# Patient Record
Sex: MALE | Race: WHITE | NOT HISPANIC OR LATINO
[De-identification: names, ages, dates, MRNs, and addresses within clinical notes are randomized per-mention and may not be internally consistent; named-entity substitution may affect disease eponyms.]

---

## 2021-06-17 ENCOUNTER — TRANSCRIPTION ENCOUNTER (OUTPATIENT)
Age: 41
End: 2021-06-17

## 2021-09-29 ENCOUNTER — APPOINTMENT (OUTPATIENT)
Dept: PULMONOLOGY | Facility: CLINIC | Age: 41
End: 2021-09-29
Payer: COMMERCIAL

## 2021-09-29 VITALS
BODY MASS INDEX: 42.74 KG/M2 | RESPIRATION RATE: 15 BRPM | SYSTOLIC BLOOD PRESSURE: 169 MMHG | HEIGHT: 68 IN | DIASTOLIC BLOOD PRESSURE: 91 MMHG | OXYGEN SATURATION: 96 % | WEIGHT: 282 LBS | HEART RATE: 105 BPM | TEMPERATURE: 97 F

## 2021-09-29 DIAGNOSIS — G47.10 HYPERSOMNIA, UNSPECIFIED: ICD-10-CM

## 2021-09-29 DIAGNOSIS — G47.419 NARCOLEPSY W/OUT CATAPLEXY: ICD-10-CM

## 2021-09-29 PROCEDURE — 99204 OFFICE O/P NEW MOD 45 MIN: CPT | Mod: GC

## 2021-09-29 NOTE — PHYSICAL EXAM
[General Appearance - Well Developed] : well developed [Normal Conjunctiva] : the conjunctiva exhibited no abnormalities [Normal Oropharynx] : normal oropharynx [Neck Appearance] : the appearance of the neck was normal [Heart Rate And Rhythm] : heart rate was normal and rhythm regular [Heart Sounds] : normal S1 and S2 [Respiration, Rhythm And Depth] : normal respiratory rhythm and effort [Exaggerated Use Of Accessory Muscles For Inspiration] : no accessory muscle use [Auscultation Breath Sounds / Voice Sounds] : lungs were clear to auscultation bilaterally [Abnormal Walk] : normal gait [Nail Clubbing] : no clubbing  or cyanosis of the fingernails [] : no rash [No Focal Deficits] : no focal deficits [Oriented To Time, Place, And Person] : oriented to person, place, and time [Impaired Insight] : insight and judgment were intact [Affect] : the affect was normal
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
## 2021-09-30 NOTE — REVIEW OF SYSTEMS
[EDS: ESS=____] : daytime somnolence: ESS=[unfilled] [Fatigue] : fatigue [Recent Wt Gain (___ Lbs)] : recent [unfilled] ~Ulb weight gain [Snoring] : snoring [Witnessed Apneas] : witnessed apnea [Obesity] : obesity [Unusual Sleep Behavior] : unusual sleep behavior [Hypersomnolence] : sleeping much more than usual [Hypnogogic Hallucinations] : hypnogogic hallucinations [A.M. Dry Mouth] : no a.m. dry mouth [Chest Pain] : no chest pain [Palpitations] : no palpitations [Edema] : ~T edema was not present [CHF] : no congestive heart failure [Thyroid Disease] : no thyroid disease [Diabetes] : no diabetes  [Anemia] : no anemia [A.M. Headache] : no headache present upon awakening [Leg Dysesthesias] : no leg dysesthesias [Heartburn] : no heartburn [Nocturia] : no nocturia [Arthralgias] : no arthralgias [Depression] : no depression [Anxious] : not anxious [Difficulty Initiating Sleep] : no difficulty falling asleep [Difficulty Maintaining Sleep] : no difficulty maintaining sleep [Acute Insomnia] : no acute insomnia [Chronic Insomnia] : no chronic  insomnia [Lower Extremity Discomfort] : no lower extremity discomfort [Irresistible urge to move legs] : no irresistible urge to move legs because of lower extremity discomfort [LE discomfort relieved by movement] : lower extremity discomfort not relieved by movement [Late day/ Evening symptoms] : no late day/evening symptoms [Sleep Disturbances due to LE symptoms] : ~T no sleep disturbances due to lower extremity symptoms [Cataplexy] :  no cataplexy [Hypnopompic Hallucinations] : no hypnopompic hallucinations [Sleep Paralysis] : no sleep paralysis [FreeTextEntry8] : Dyspnea on exertion [de-identified] : history of sleep walking and sleep talking
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
## 2021-12-16 ENCOUNTER — APPOINTMENT (OUTPATIENT)
Dept: SLEEP CENTER | Facility: CLINIC | Age: 41
End: 2021-12-16

## 2021-12-18 ENCOUNTER — NON-APPOINTMENT (OUTPATIENT)
Age: 41
End: 2021-12-18

## 2022-01-03 ENCOUNTER — APPOINTMENT (OUTPATIENT)
Dept: FAMILY MEDICINE | Facility: CLINIC | Age: 42
End: 2022-01-03
Payer: COMMERCIAL

## 2022-01-03 ENCOUNTER — NON-APPOINTMENT (OUTPATIENT)
Age: 42
End: 2022-01-03

## 2022-01-03 VITALS
HEIGHT: 68 IN | TEMPERATURE: 97.4 F | RESPIRATION RATE: 16 BRPM | OXYGEN SATURATION: 98 % | BODY MASS INDEX: 43.5 KG/M2 | HEART RATE: 90 BPM | DIASTOLIC BLOOD PRESSURE: 84 MMHG | WEIGHT: 287 LBS | SYSTOLIC BLOOD PRESSURE: 140 MMHG

## 2022-01-03 VITALS — SYSTOLIC BLOOD PRESSURE: 150 MMHG | DIASTOLIC BLOOD PRESSURE: 90 MMHG

## 2022-01-03 DIAGNOSIS — Z83.49 FAMILY HISTORY OF OTHER ENDOCRINE, NUTRITIONAL AND METABOLIC DISEASES: ICD-10-CM

## 2022-01-03 DIAGNOSIS — Z82.49 FAMILY HISTORY OF ISCHEMIC HEART DISEASE AND OTHER DISEASES OF THE CIRCULATORY SYSTEM: ICD-10-CM

## 2022-01-03 DIAGNOSIS — Z87.891 PERSONAL HISTORY OF NICOTINE DEPENDENCE: ICD-10-CM

## 2022-01-03 DIAGNOSIS — Z13.228 ENCOUNTER FOR SCREENING FOR OTHER SUSPECTED ENDOCRINE DISORDER: ICD-10-CM

## 2022-01-03 DIAGNOSIS — Z13.0 ENCOUNTER FOR SCREENING FOR OTHER SUSPECTED ENDOCRINE DISORDER: ICD-10-CM

## 2022-01-03 DIAGNOSIS — Z13.29 ENCOUNTER FOR SCREENING FOR OTHER SUSPECTED ENDOCRINE DISORDER: ICD-10-CM

## 2022-01-03 DIAGNOSIS — K58.0 IRRITABLE BOWEL SYNDROME WITH DIARRHEA: ICD-10-CM

## 2022-01-03 DIAGNOSIS — Z83.3 FAMILY HISTORY OF DIABETES MELLITUS: ICD-10-CM

## 2022-01-03 DIAGNOSIS — Z76.89 PERSONS ENCOUNTERING HEALTH SERVICES IN OTHER SPECIFIED CIRCUMSTANCES: ICD-10-CM

## 2022-01-03 DIAGNOSIS — Z78.9 OTHER SPECIFIED HEALTH STATUS: ICD-10-CM

## 2022-01-03 DIAGNOSIS — Z83.438 FAMILY HISTORY OF OTHER DISORDER OF LIPOPROTEIN METABOLISM AND OTHER LIPIDEMIA: ICD-10-CM

## 2022-01-03 DIAGNOSIS — Z13.21 ENCOUNTER FOR SCREENING FOR NUTRITIONAL DISORDER: ICD-10-CM

## 2022-01-03 PROCEDURE — 93000 ELECTROCARDIOGRAM COMPLETE: CPT

## 2022-01-03 PROCEDURE — 99204 OFFICE O/P NEW MOD 45 MIN: CPT | Mod: 25

## 2022-01-03 NOTE — PHYSICAL EXAM
[No Acute Distress] : no acute distress [Well Nourished] : well nourished [Well Developed] : well developed [Well-Appearing] : well-appearing [Normal Sclera/Conjunctiva] : normal sclera/conjunctiva [PERRL] : pupils equal round and reactive to light [Normal Outer Ear/Nose] : the outer ears and nose were normal in appearance [Normal TMs] : both tympanic membranes were normal [Normal Appearance] : was normal in appearance [Neck Supple] : was supple [Normal] : the thyroid was normal [No Respiratory Distress] : no respiratory distress  [Clear to Auscultation] : lungs were clear to auscultation bilaterally [Normal Rate] : normal rate  [Regular Rhythm] : with a regular rhythm [Normal S1, S2] : normal S1 and S2 [No Murmur] : no murmur heard [No Carotid Bruits] : no carotid bruits [No Edema] : there was no peripheral edema [Soft] : abdomen soft [Non Tender] : non-tender [Normal Bowel Sounds] : normal bowel sounds [Obese] : obese [Normal Posterior Cervical Nodes] : no posterior cervical lymphadenopathy [Normal Anterior Cervical Nodes] : no anterior cervical lymphadenopathy [No Spinal Tenderness] : no spinal tenderness [No Rash] : no rash [No Focal Deficits] : no focal deficits [Normal Affect] : the affect was normal [Alert and Oriented x3] : oriented to person, place, and time [Normal Mood] : the mood was normal

## 2022-01-07 ENCOUNTER — APPOINTMENT (OUTPATIENT)
Dept: SURGERY | Facility: CLINIC | Age: 42
End: 2022-01-07

## 2022-01-07 VITALS
HEART RATE: 106 BPM | SYSTOLIC BLOOD PRESSURE: 126 MMHG | DIASTOLIC BLOOD PRESSURE: 84 MMHG | BODY MASS INDEX: 42.89 KG/M2 | WEIGHT: 283 LBS | OXYGEN SATURATION: 94 % | HEIGHT: 68 IN

## 2022-01-10 LAB
25(OH)D3 SERPL-MCNC: 15.6 NG/ML
ALBUMIN SERPL ELPH-MCNC: 4.6 G/DL
ALP BLD-CCNC: 91 U/L
ALT SERPL-CCNC: 19 U/L
ANION GAP SERPL CALC-SCNC: 14 MMOL/L
APPEARANCE: ABNORMAL
AST SERPL-CCNC: 18 U/L
BACTERIA: NEGATIVE
BASOPHILS # BLD AUTO: 0.04 K/UL
BASOPHILS NFR BLD AUTO: 0.4 %
BILIRUB SERPL-MCNC: 0.2 MG/DL
BILIRUBIN URINE: NEGATIVE
BLOOD URINE: NORMAL
BUN SERPL-MCNC: 17 MG/DL
CALCIUM SERPL-MCNC: 9.7 MG/DL
CHLORIDE SERPL-SCNC: 105 MMOL/L
CHOLEST SERPL-MCNC: 303 MG/DL
CO2 SERPL-SCNC: 23 MMOL/L
COLOR: NORMAL
CREAT SERPL-MCNC: 0.95 MG/DL
EOSINOPHIL # BLD AUTO: 0.09 K/UL
EOSINOPHIL NFR BLD AUTO: 0.9 %
ESTIMATED AVERAGE GLUCOSE: 126 MG/DL
FOLATE SERPL-MCNC: 14.2 NG/ML
GLUCOSE QUALITATIVE U: NEGATIVE
GLUCOSE SERPL-MCNC: 95 MG/DL
HBA1C MFR BLD HPLC: 6 %
HCT VFR BLD CALC: 42.2 %
HDLC SERPL-MCNC: 45 MG/DL
HGB BLD-MCNC: 14.1 G/DL
IMM GRANULOCYTES NFR BLD AUTO: 0.7 %
INSULIN P FAST SERPL-ACNC: 52.8 UU/ML
KETONES URINE: NEGATIVE
LDLC SERPL CALC-MCNC: NORMAL MG/DL
LEUKOCYTE ESTERASE URINE: NEGATIVE
LYMPHOCYTES # BLD AUTO: 2.48 K/UL
LYMPHOCYTES NFR BLD AUTO: 25.3 %
MAN DIFF?: NORMAL
MCHC RBC-ENTMCNC: 29.7 PG
MCHC RBC-ENTMCNC: 33.4 GM/DL
MCV RBC AUTO: 89 FL
MICROSCOPIC-UA: NORMAL
MONOCYTES # BLD AUTO: 0.68 K/UL
MONOCYTES NFR BLD AUTO: 6.9 %
NEUTROPHILS # BLD AUTO: 6.45 K/UL
NEUTROPHILS NFR BLD AUTO: 65.8 %
NITRITE URINE: NEGATIVE
NONHDLC SERPL-MCNC: 259 MG/DL
PH URINE: 5.5
PLATELET # BLD AUTO: 383 K/UL
POTASSIUM SERPL-SCNC: 4.4 MMOL/L
PROT SERPL-MCNC: 7.5 G/DL
PROTEIN URINE: NEGATIVE
RBC # BLD: 4.74 M/UL
RBC # FLD: 12.9 %
RED BLOOD CELLS URINE: 0 /HPF
SODIUM SERPL-SCNC: 142 MMOL/L
SPECIFIC GRAVITY URINE: >=1.03
SQUAMOUS EPITHELIAL CELLS: 0 /HPF
T4 FREE SERPL-MCNC: 0.9 NG/DL
TRIGL SERPL-MCNC: 673 MG/DL
TSH SERPL-ACNC: 1.95 UIU/ML
URATE SERPL-MCNC: 7.7 MG/DL
URINE COMMENTS: NORMAL
UROBILINOGEN URINE: NORMAL
VIT B12 SERPL-MCNC: 451 PG/ML
WBC # FLD AUTO: 9.81 K/UL
WHITE BLOOD CELLS URINE: 0 /HPF

## 2022-01-17 ENCOUNTER — APPOINTMENT (OUTPATIENT)
Dept: FAMILY MEDICINE | Facility: CLINIC | Age: 42
End: 2022-01-17
Payer: COMMERCIAL

## 2022-01-17 ENCOUNTER — TRANSCRIPTION ENCOUNTER (OUTPATIENT)
Age: 42
End: 2022-01-17

## 2022-01-17 ENCOUNTER — RESULT REVIEW (OUTPATIENT)
Age: 42
End: 2022-01-17

## 2022-01-17 VITALS
DIASTOLIC BLOOD PRESSURE: 80 MMHG | OXYGEN SATURATION: 98 % | BODY MASS INDEX: 42.89 KG/M2 | HEART RATE: 75 BPM | TEMPERATURE: 97.2 F | SYSTOLIC BLOOD PRESSURE: 138 MMHG | HEIGHT: 68 IN | WEIGHT: 283 LBS

## 2022-01-17 VITALS — DIASTOLIC BLOOD PRESSURE: 78 MMHG | SYSTOLIC BLOOD PRESSURE: 130 MMHG

## 2022-01-17 DIAGNOSIS — M25.551 PAIN IN RIGHT HIP: ICD-10-CM

## 2022-01-17 PROCEDURE — 99214 OFFICE O/P EST MOD 30 MIN: CPT

## 2022-01-17 NOTE — ASSESSMENT
[FreeTextEntry1] : 41 year old male presents as a new patient to establish care\par \par HTN\par - start Losartan 100 mg daily\par - start Amlodipine 5 mg daily\par - low sodium diet\par - monitor\par - see cardiology \par \par Obesity\par - improve upon diet and exercise\par - check blood work\par - referred to bariatric surgery for further evaluation\par \par IBS with diarrhea\par - c/w diet\par - consider GI follow up \par \par return as needed and in 2-4 weeks for a follow up

## 2022-01-17 NOTE — HISTORY OF PRESENT ILLNESS
[FreeTextEntry1] : Establish care [de-identified] : \par 41 year old male presents as a new patient to establish care\par \par Has Narcolepsy- on Armodafinil \par following with pulmonology\par \par reports having hypertension x 5-10 years\par did not have insurance for several years\par no headaches\par no chest pain\par tried Losartan 100 mg for a month- did not notice an improvement with his blood pressure\par \par does report having SOB with exertion (climbing stairs)\par \par Obese\par has been at his current weight for the past 2 years\par \par Has IBS with diarrhea\par mainly diet related\par has seen GI in the past\par \par 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

## 2022-01-17 NOTE — HEALTH RISK ASSESSMENT
[Former] : Former [Yes] : Yes [2 - 4 times a month (2 pts)] : 2-4 times a month (2 points) [3 or 4 (1 pt)] : 3 or 4  (1 point) [Never (0 pts)] : Never (0 points) [No] : In the past 12 months have you used drugs other than those required for medical reasons? No [de-identified] : smoked x 15 years, 4- 5 cigarettes a day [YearQuit] : 2020 [Audit-CScore] : 3 [de-identified] : Not exercising regularly  [de-identified] : Diet needs improvement

## 2022-01-20 NOTE — ASSESSMENT
[FreeTextEntry1] : HTN\par - BP not at goal\par - c/w Losartan 100 mg daily\par - c/w Amlodipine 10 mg daily\par - start Chlorthalidone 25 mg daily\par - has appointment scheduled to see cardiology\par - continue to monitor BP \par - check blood work in 1 month to monitor electrolytes \par \par Hyperlipidemia\par - not controlled\par - start Rosuvastatin 5 mg daily\par - focus on diet and exercise as tolerated\par \par Prediabetes\par - focus on diet and exercise as tolerated \par \par Right hip pain\par - referred for x-rays \par \par Obesity\par - focus on diet and exercise as tolerated\par - has scheduled appointment to see bariatric surgeon \par \par Hoarse voice may be from postnasal drip\par can try Flonase nasal spray or antihistamine to help\par

## 2022-01-20 NOTE — PHYSICAL EXAM
[No Acute Distress] : no acute distress [Well Nourished] : well nourished [Well Developed] : well developed [Well-Appearing] : well-appearing [Normal Oropharynx] : the oropharynx was normal [Normal TMs] : both tympanic membranes were normal [Normal Appearance] : was normal in appearance [Neck Supple] : was supple [No Respiratory Distress] : no respiratory distress  [Clear to Auscultation] : lungs were clear to auscultation bilaterally [Normal Rate] : normal rate  [Regular Rhythm] : with a regular rhythm [Normal S1, S2] : normal S1 and S2 [No Murmur] : no murmur heard [No Edema] : there was no peripheral edema [Normal Affect] : the affect was normal [Alert and Oriented x3] : oriented to person, place, and time [Normal Mood] : the mood was normal

## 2022-01-20 NOTE — HISTORY OF PRESENT ILLNESS
[FreeTextEntry1] : Follow up [de-identified] : \par 41 year old male presents for a follow up\par \par Established care in our office 2 weeks ago\par BP had been elevated \par was started on Losartan 100 mg daily,  Amlodipine 5 mg daily for HTN \par raised Amlodipine to 10 mg over a week ago as his BP remained elevated\par SBP has been running 130s, DBP in 80s\par this morning BP was 130/80\par \par c/o hoarse voice for the past week \par does report associated cough\par \par had blood work \par Insulin level elevated at 52.8\par A1C 6.0- prediabetic range \par \par Total cholesterol 303\par Triglycerides 673\par HDL 45\par LDL not able to be calculated \par \par Has Narcolepsy- on Armodafinil \par following with pulmonology\par \par Obese\par has been at his current weight for the past 2 years\par \par Has IBS with diarrhea\par mainly diet related\par has seen GI in the past\par \par c/o right hip pain\par \par \par 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

## 2022-01-20 NOTE — REVIEW OF SYSTEMS
[Fever] : no fever [Chills] : no chills [Chest Pain] : no chest pain [Shortness Of Breath] : no shortness of breath [Headache] : no headache [FreeTextEntry9] : right hip pain

## 2022-01-21 ENCOUNTER — APPOINTMENT (OUTPATIENT)
Dept: SURGERY | Facility: CLINIC | Age: 42
End: 2022-01-21
Payer: COMMERCIAL

## 2022-01-21 ENCOUNTER — OUTPATIENT (OUTPATIENT)
Dept: OUTPATIENT SERVICES | Facility: HOSPITAL | Age: 42
LOS: 1 days | End: 2022-01-21
Payer: COMMERCIAL

## 2022-01-21 ENCOUNTER — APPOINTMENT (OUTPATIENT)
Dept: RADIOLOGY | Facility: CLINIC | Age: 42
End: 2022-01-21
Payer: COMMERCIAL

## 2022-01-21 VITALS
WEIGHT: 283 LBS | SYSTOLIC BLOOD PRESSURE: 128 MMHG | BODY MASS INDEX: 42.89 KG/M2 | HEIGHT: 68 IN | HEART RATE: 78 BPM | DIASTOLIC BLOOD PRESSURE: 77 MMHG | OXYGEN SATURATION: 98 %

## 2022-01-21 DIAGNOSIS — E66.01 MORBID (SEVERE) OBESITY DUE TO EXCESS CALORIES: ICD-10-CM

## 2022-01-21 DIAGNOSIS — M25.551 PAIN IN RIGHT HIP: ICD-10-CM

## 2022-01-21 PROCEDURE — 73502 X-RAY EXAM HIP UNI 2-3 VIEWS: CPT | Mod: 26,RT

## 2022-01-21 PROCEDURE — 99204 OFFICE O/P NEW MOD 45 MIN: CPT

## 2022-01-21 PROCEDURE — 72100 X-RAY EXAM L-S SPINE 2/3 VWS: CPT

## 2022-01-21 PROCEDURE — 72100 X-RAY EXAM L-S SPINE 2/3 VWS: CPT | Mod: 26

## 2022-01-21 PROCEDURE — 73502 X-RAY EXAM HIP UNI 2-3 VIEWS: CPT

## 2022-01-21 NOTE — CONSULT LETTER
[Dear  ___] : Dear  [unfilled], [Consult Letter:] : I had the pleasure of evaluating your patient, [unfilled]. [Please see my note below.] : Please see my note below. [Consult Closing:] : Thank you very much for allowing me to participate in the care of this patient.  If you have any questions, please do not hesitate to contact me. [Sincerely,] : Sincerely, [FreeTextEntry1] : Thank you for sending him for evaluation. Based on his current weight, weight loss goals, and medical history, I believe he would be an excellent candidate for a sleeve gastrectomy, which should give him an average weight loss of about 75-80lbs over 12-18 months. He is motivated and I will keep you updated as to any developments or issues that may arise. [FreeTextEntry3] : Rom Yung MD FACS\par Director, Minimally-Invasive Surgery, Robotic Surgery, Hernia Surgery\par [facility]\par Cell: [phone]

## 2022-01-21 NOTE — REVIEW OF SYSTEMS
[Easy Bleeding] : a tendency for easy bleeding [Easy Bruising] : a tendency for easy bruising [Fever] : no fever [Chills] : no chills [Eye Pain] : no eye pain [Red Eyes] : eyes not red [Dysphagia] : no dysphagia [Loss of Hearing] : no loss of hearing [Odynophagia] : no odynophagia [Mucosal Pain] : no mucosal pain [Chest Pain] : no chest pain [Palpitations] : no palpitations [Shortness Of Breath] : no shortness of breath [Wheezing] : no wheezing [Abdominal Pain] : no abdominal pain [Vomiting] : no vomiting [Reflux/Heartburn] : no reflex/heartburn [Hernia] : no hernia [Dysuria] : no dysuria [Incontinence] : no incontinence [Joint Pain] : no joint pain [Joint Stiffness] : no joint stiffness [Skin Rash] : no skin rash [Skin Wound] : no skin wound [Confused] : no confusion [Dizziness] : no dizziness [Suicidal] : not suicidal [Insomnia] : no insomnia [Proptosis] : no proptosis [Hot Flashes] : no hot flashes

## 2022-01-21 NOTE — HISTORY OF PRESENT ILLNESS
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 Patient reports that he has been gaining weight steadily since his 20s and has tried numerous times to lose weight. He previously tried keto/paleo diets with increased exercise, which gave his roughly 30lbs of weight loss before he plateaued and regained the weight back. He is interested in bariatric surgery, specifically sleeve gastrectomy. He has several friends who have undergone sleeve gastrectomy and have done well.\par Denies GERD, chronic NSAID use.\par Denies tobacco use or significant EtOH use.\par No recent illnesses or fevers/chills.

## 2022-02-01 ENCOUNTER — RX RENEWAL (OUTPATIENT)
Age: 42
End: 2022-02-01

## 2022-02-03 ENCOUNTER — NON-APPOINTMENT (OUTPATIENT)
Age: 42
End: 2022-02-03

## 2022-02-04 LAB
25(OH)D3 SERPL-MCNC: 18.8 NG/ML
ALBUMIN SERPL ELPH-MCNC: 5 G/DL
ALBUMIN SERPL ELPH-MCNC: 5.1 G/DL
ALP BLD-CCNC: 102 U/L
ALP BLD-CCNC: 98 U/L
ALT SERPL-CCNC: 18 U/L
ALT SERPL-CCNC: 19 U/L
ANION GAP SERPL CALC-SCNC: 15 MMOL/L
ANION GAP SERPL CALC-SCNC: 18 MMOL/L
APPEARANCE: CLEAR
APTT BLD: 38.3 SEC
AST SERPL-CCNC: 17 U/L
AST SERPL-CCNC: 20 U/L
BACTERIA: NEGATIVE
BASOPHILS # BLD AUTO: 0.05 K/UL
BASOPHILS NFR BLD AUTO: 0.5 %
BILIRUB SERPL-MCNC: 0.4 MG/DL
BILIRUB SERPL-MCNC: 0.4 MG/DL
BILIRUBIN URINE: NEGATIVE
BLOOD URINE: NEGATIVE
BUN SERPL-MCNC: 12 MG/DL
BUN SERPL-MCNC: 12 MG/DL
CALCIUM SERPL-MCNC: 10.1 MG/DL
CHLORIDE SERPL-SCNC: 95 MMOL/L
CHLORIDE SERPL-SCNC: 96 MMOL/L
CHOLEST SERPL-MCNC: 148 MG/DL
CO2 SERPL-SCNC: 24 MMOL/L
CO2 SERPL-SCNC: 26 MMOL/L
COLOR: YELLOW
CREAT SERPL-MCNC: 0.76 MG/DL
CREAT SERPL-MCNC: 0.77 MG/DL
EOSINOPHIL # BLD AUTO: 0.16 K/UL
EOSINOPHIL NFR BLD AUTO: 1.6 %
ESTIMATED AVERAGE GLUCOSE: 126 MG/DL
FOLATE SERPL-MCNC: >20 NG/ML
GLUCOSE QUALITATIVE U: NEGATIVE
GLUCOSE SERPL-MCNC: 94 MG/DL
GLUCOSE SERPL-MCNC: 95 MG/DL
HBA1C MFR BLD HPLC: 6 %
HCT VFR BLD CALC: 42.6 %
HDLC SERPL-MCNC: 47 MG/DL
HGB BLD-MCNC: 13.9 G/DL
HYALINE CASTS: 0 /LPF
IMM GRANULOCYTES NFR BLD AUTO: 0.4 %
INR PPP: 0.98 RATIO
IRON SATN MFR SERPL: 17 %
IRON SERPL-MCNC: 61 UG/DL
KETONES URINE: NEGATIVE
LDLC SERPL CALC-MCNC: 67 MG/DL
LEUKOCYTE ESTERASE URINE: NEGATIVE
LYMPHOCYTES # BLD AUTO: 2.1 K/UL
LYMPHOCYTES NFR BLD AUTO: 20.5 %
MAN DIFF?: NORMAL
MCHC RBC-ENTMCNC: 28.8 PG
MCHC RBC-ENTMCNC: 32.6 GM/DL
MCV RBC AUTO: 88.2 FL
MICROSCOPIC-UA: NORMAL
MONOCYTES # BLD AUTO: 0.79 K/UL
MONOCYTES NFR BLD AUTO: 7.7 %
NEUTROPHILS # BLD AUTO: 7.12 K/UL
NEUTROPHILS NFR BLD AUTO: 69.3 %
NITRITE URINE: NEGATIVE
NONHDLC SERPL-MCNC: 101 MG/DL
PARATHYROID HORMONE INTACT: 65 PG/ML
PH URINE: 7
PLATELET # BLD AUTO: 392 K/UL
POTASSIUM SERPL-SCNC: 3.7 MMOL/L
POTASSIUM SERPL-SCNC: 3.7 MMOL/L
PREALB SERPL NEPH-MCNC: 27 MG/DL
PROT SERPL-MCNC: 7.3 G/DL
PROT SERPL-MCNC: 7.3 G/DL
PROTEIN URINE: NEGATIVE
PT BLD: 11.6 SEC
RBC # BLD: 4.83 M/UL
RBC # FLD: 12.6 %
RED BLOOD CELLS URINE: 1 /HPF
SODIUM SERPL-SCNC: 137 MMOL/L
SODIUM SERPL-SCNC: 137 MMOL/L
SPECIFIC GRAVITY URINE: 1.01
SQUAMOUS EPITHELIAL CELLS: 0 /HPF
TIBC SERPL-MCNC: 351 UG/DL
TRIGL SERPL-MCNC: 170 MG/DL
TSH SERPL-ACNC: 1.46 UIU/ML
UIBC SERPL-MCNC: 290 UG/DL
UROBILINOGEN URINE: NORMAL
VIT B12 SERPL-MCNC: 769 PG/ML
WBC # FLD AUTO: 10.26 K/UL
WHITE BLOOD CELLS URINE: 0 /HPF

## 2022-02-07 LAB
H PYLORI AB SER-ACNC: 86.2 UNITS
H PYLORI IGA SER-ACNC: 74.2 UNITS
ZINC SERPL-MCNC: 91 UG/DL

## 2022-02-14 ENCOUNTER — APPOINTMENT (OUTPATIENT)
Dept: ORTHOPEDIC SURGERY | Facility: CLINIC | Age: 42
End: 2022-02-14
Payer: COMMERCIAL

## 2022-02-14 VITALS
WEIGHT: 283 LBS | SYSTOLIC BLOOD PRESSURE: 148 MMHG | HEIGHT: 68 IN | DIASTOLIC BLOOD PRESSURE: 90 MMHG | HEART RATE: 75 BPM | BODY MASS INDEX: 42.89 KG/M2

## 2022-02-14 DIAGNOSIS — M53.3 SACROCOCCYGEAL DISORDERS, NOT ELSEWHERE CLASSIFIED: ICD-10-CM

## 2022-02-14 DIAGNOSIS — S76.311S STRAIN OF MUSCLE, FASCIA AND TENDON OF THE POSTERIOR MUSCLE GROUP AT THIGH LEVEL, RIGHT THIGH, SEQUELA: ICD-10-CM

## 2022-02-14 LAB — VIT A SERPL-MCNC: 56 UG/DL

## 2022-02-14 PROCEDURE — 99204 OFFICE O/P NEW MOD 45 MIN: CPT

## 2022-02-14 PROCEDURE — 73503 X-RAY EXAM HIP UNI 4/> VIEWS: CPT

## 2022-02-14 NOTE — ADDENDUM
[FreeTextEntry1] : Documented by Crow Ritter acting as a scribe for Dr. Pop on 02/14/2022. \par \par All medical record entries made by the Scribe were at my, Dr. Pop's, direction and\par personally dictated by me on 02/14/2022. I have reviewed the chart and agree that the record\par accurately reflects my personal performance of the history, physical exam, procedure and imaging.

## 2022-02-14 NOTE — PHYSICAL EXAM
[de-identified] : Physical Exam:\par General: Well appearing, no acute distress\par Neurologic: A&Ox3, No focal deficits\par Head: NCAT without abrasions, lacerations, or ecchymosis to head, face, or scalp\par Eyes: No scleral icterus, no gross abnormalities\par Respiratory: Equal chest wall expansion bilaterally, no accessory muscle use\par Lymphatic: No lymphadenopathy palpated\par Skin: Warm and dry\par Psychiatric: Normal mood and affect\par \par Right Hip\par On inspection of the Right hip shows no gross abnormalities. No loss muscle volume. Skin appears normal. When asked to point at the most painful part of their hip, they point over the left SI joint\par Negative Heel strike, negative log roll. \par At 90° of flexion internal rotation is limited to 5°. Mild pain. External rotation is limited to 30°. No pain. Hip flexor strength is 4-5 because of pain.  Anterior hip impingement signs are positive. No evidence of posterior impingement. UVALDO Test (Julian's Test): Negative; FADIR Test (Labral Tear/GRISELDA): Negative. \par Resisted straight leg raise does not produce groin pain. No signs of the iliopsoas tendinitis\par No audible or palpable clicking. \par On lateral decubitus examination there is no focal area of her greater trochanteric tenderness. Abductor strength is 4 out of 5.\par \par Motor strength is intact distally, 5/5 over quads,hamstring, EHL, FHL, GSC, TA.\par Sensation intact over L1-S1 dermatomes\par \par Figure four test: POSITIVE on left side\par SI Compression test: POSITIVE on left side\par Piriformis TTP. \par \par Left hip Exam\par \par ·	Skin: Clean/dry and intact\par ·	Inspection: No obvious deformity, no swelling.\par ·	Pulses: 2+ DP/PT pulses\par ·	ROM: 130 flexion without pain. Internal rotation to 15. External rotation to 45.\par ·	Painful ROM: None\par ·	Tenderness: No tenderness over greater trochanter/glut medius insertion. No groin pain. No ttp over the ASIS/Illiac crest.\par ·	Strength: 5/5 ADD/ABD/Q/H/TA/GS/EHL\par ·	Neuro: Sensation in tact to light touch throughout, DTR normal\par ·	Additional tests: Negative impingement test  [de-identified] : Bilateral AP, frog leg, Toscano views and false profile views were performed today in the office. They demonstrate CAM lesion (L>R), femoroacetabular impingement of the BILAT hip. Alpha angle is [default] with asphericiy of the femoral head. Lateral center edge angle is [defaultvalue]. Anterior center edge angle is [defaultvalue]. Tonnis angle of inclination is [defaultvalue]. Tonnis angle of inclination is [defaultvalue].\par

## 2022-02-14 NOTE — DISCUSSION/SUMMARY
[de-identified] : OTILIO CHIRINOS is a 41 year male being seen for initial visit R hip pain. 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 Currently, he reports most pain when driving. He states the pain does not get worse with movement. He endorses pain over posterior aspect of R hip. Patient denies numbness and tingling to the extremities. He endorses painful clicking and popping in hip. Patient denies numbness and tingling to the extremities. He denies use of OTC pain medications. He has not done any formal PT.\par \par We had a thorough discussion regarding the nature of his pain, the pathophysiology, as well as all treatment options. Based on his clinical exam, and radiographs, he has piriformis syndrome, SI joint dysfunction, gluteus tendinitis of which I discussed operative and non-operative treatment modalities. Normal radiographs were performed and reviewed today. Based off of his symptoms of pain with weakness and no prior conservative measures tried, patient necessitates physical therapy, and an anti-inflammatory. Patient denies GI issues, HTN or DM. A prescription of Diclofenac was given and patient was informed about its risks and benefits. He was also given a prescription for PT that he will perform 2x/week for 6-8 weeks. He will see us for clinical reassessment at that time. In future we might refer patient to Dr. Calderon for further care. He agrees with the above plan and all questions were answered.\par

## 2022-02-15 ENCOUNTER — NON-APPOINTMENT (OUTPATIENT)
Age: 42
End: 2022-02-15

## 2022-02-15 ENCOUNTER — APPOINTMENT (OUTPATIENT)
Dept: CARDIOLOGY | Facility: CLINIC | Age: 42
End: 2022-02-15
Payer: COMMERCIAL

## 2022-02-15 VITALS
DIASTOLIC BLOOD PRESSURE: 92 MMHG | SYSTOLIC BLOOD PRESSURE: 120 MMHG | BODY MASS INDEX: 42.44 KG/M2 | HEIGHT: 68 IN | HEART RATE: 79 BPM | WEIGHT: 280 LBS | OXYGEN SATURATION: 97 %

## 2022-02-15 DIAGNOSIS — R06.00 DYSPNEA, UNSPECIFIED: ICD-10-CM

## 2022-02-15 LAB
A-TOCOPHEROL VIT E SERPL-MCNC: 13.2 MG/L
BETA+GAMMA TOCOPHEROL SERPL-MCNC: 1.6 MG/L

## 2022-02-15 PROCEDURE — 99204 OFFICE O/P NEW MOD 45 MIN: CPT

## 2022-02-15 RX ORDER — AMLODIPINE BESYLATE 5 MG/1
5 TABLET ORAL DAILY
Qty: 90 | Refills: 0 | Status: DISCONTINUED | COMMUNITY
Start: 2022-01-03 | End: 2022-02-15

## 2022-02-15 NOTE — HISTORY OF PRESENT ILLNESS
[FreeTextEntry1] : Pt is a 40 y/o M who is referred here today by their PCP for evaluation.  Pt has PMH HTN, HLD, preDM, BMI 43, narcolepsy\par He states that he gets SOB with exertion - denies CP, diaphoresis, palpitations, dizziness, syncope, LE edema, PND, orthopnea. \par He also mentions that he thinks losartan may be giving him a cough\par Home -130's/70-80's\par COVID vaccine 02/2021, booster 11/2021 Pfizer\par \par PMH: HTN, HLD, preDM, BMI 43, narcolepsy, hyperfibrinolysis\par Family hx: father CABG 60, grandfather CABG\par Smoking status: quit 2020\par social ETOH \par no drug use\par Current exercise: none \par Daily water intake: "not known"\par Daily caffeine intake: 2 cups coffee\par OTC medications: MVI, fishoil, vit D, probiotic\par Previous cardiac testing: none\par \par

## 2022-02-15 NOTE — DISCUSSION/SUMMARY
[FreeTextEntry1] : Pt is a 40 y/o M PMH HTN, HLD, preDM, BMI 43, narcolepsy p/w SOB \par Will check transthoracic echocardiogram to evaluate left ventricular function and assess for any structural abnormalities\par will perform ETT to assess patient's current cardiac reserve to incremental activity and check for provocable ECG changes.\par Advised pt to go to the nearest ED if symptoms persist or worsen\par \par HTN:\par not well controlled\par c/w norvasc\par will tyr to decrease losartan to see if this helps with cough (although an unlikely SE)\par will increase chlorthalidone\par Advised low salt diet, regular exercise, weight loss \par \par HLD:\par c/w statin\par Advised lifestyle modifications \par \par Pt will return in 3 mnths or sooner as needed but I encouraged communication via phone or portal if necessary. \par The described plan was discussed with the pt.  All questions and concerns were addressed to the best of my knowledge.

## 2022-02-16 LAB — VIT B1 SERPL-MCNC: 161.3 NMOL/L

## 2022-02-22 LAB — MENADIONE SERPL-MCNC: 0.23 NG/ML

## 2022-03-22 ENCOUNTER — APPOINTMENT (OUTPATIENT)
Dept: CARDIOLOGY | Facility: CLINIC | Age: 42
End: 2022-03-22
Payer: COMMERCIAL

## 2022-03-22 PROCEDURE — 93306 TTE W/DOPPLER COMPLETE: CPT

## 2022-03-22 PROCEDURE — 93015 CV STRESS TEST SUPVJ I&R: CPT

## 2022-04-18 ENCOUNTER — APPOINTMENT (OUTPATIENT)
Dept: ORTHOPEDIC SURGERY | Facility: CLINIC | Age: 42
End: 2022-04-18

## 2022-05-05 ENCOUNTER — FORM ENCOUNTER (OUTPATIENT)
Age: 42
End: 2022-05-05

## 2022-05-05 ENCOUNTER — APPOINTMENT (OUTPATIENT)
Dept: SLEEP CENTER | Facility: CLINIC | Age: 42
End: 2022-05-05
Payer: COMMERCIAL

## 2022-05-05 ENCOUNTER — OUTPATIENT (OUTPATIENT)
Dept: OUTPATIENT SERVICES | Facility: HOSPITAL | Age: 42
LOS: 1 days | End: 2022-05-05
Payer: COMMERCIAL

## 2022-05-05 PROCEDURE — 95805 MULTIPLE SLEEP LATENCY TEST: CPT | Mod: 26

## 2022-05-05 PROCEDURE — 95810 POLYSOM 6/> YRS 4/> PARAM: CPT | Mod: 26

## 2022-05-05 PROCEDURE — 95810 POLYSOM 6/> YRS 4/> PARAM: CPT

## 2022-05-09 ENCOUNTER — APPOINTMENT (OUTPATIENT)
Dept: SLEEP CENTER | Facility: CLINIC | Age: 42
End: 2022-05-09
Payer: COMMERCIAL

## 2022-05-09 ENCOUNTER — OUTPATIENT (OUTPATIENT)
Dept: OUTPATIENT SERVICES | Facility: HOSPITAL | Age: 42
LOS: 1 days | End: 2022-05-09
Payer: COMMERCIAL

## 2022-05-09 ENCOUNTER — RX RENEWAL (OUTPATIENT)
Age: 42
End: 2022-05-09

## 2022-05-09 DIAGNOSIS — G47.33 OBSTRUCTIVE SLEEP APNEA (ADULT) (PEDIATRIC): ICD-10-CM

## 2022-05-09 PROCEDURE — 95811 POLYSOM 6/>YRS CPAP 4/> PARM: CPT

## 2022-05-09 PROCEDURE — 95811 POLYSOM 6/>YRS CPAP 4/> PARM: CPT | Mod: 26

## 2022-05-10 ENCOUNTER — APPOINTMENT (OUTPATIENT)
Dept: CARDIOLOGY | Facility: CLINIC | Age: 42
End: 2022-05-10
Payer: COMMERCIAL

## 2022-05-10 VITALS
DIASTOLIC BLOOD PRESSURE: 80 MMHG | SYSTOLIC BLOOD PRESSURE: 112 MMHG | BODY MASS INDEX: 42.44 KG/M2 | HEIGHT: 68 IN | OXYGEN SATURATION: 95 % | WEIGHT: 280 LBS | HEART RATE: 69 BPM

## 2022-05-10 PROCEDURE — 99213 OFFICE O/P EST LOW 20 MIN: CPT

## 2022-05-10 NOTE — DISCUSSION/SUMMARY
[FreeTextEntry1] : Pt is a 41 y/o M PMH HTN, HLD, preDM, BMI 43, narcolepsy, HANS\par \par HTN:\par now well controlled\par c/w norvasc, losartan, chlorthalidone\par Advised low salt diet, regular exercise, weight loss \par \par HLD:\par c/w statin\par Advised lifestyle modifications \par \par HANS:\par following with pulm\par CPAP on order\par \par Pt will return in 6 mnths or sooner as needed but I encouraged communication via phone or portal if necessary. \par The described plan was discussed with the pt.  All questions and concerns were addressed to the best of my knowledge.

## 2022-05-10 NOTE — HISTORY OF PRESENT ILLNESS
[FreeTextEntry1] : Pt is a 43 y/o M PMH HTN, HLD, preDM, BMI 43, narcolepsy, HANS - following with pulm - CPAP on order\par Pt reports feeling well and has no active cardiac complaints - denies CP, SOB, palpitations, dizziness, syncope, edema, orthopnea, PND, orthopnea.  No exertional symptoms. \par Cough has improved on lower dose of losartan\par COVID vaccine 02/2021, booster 11/2021 Pfizer\par \par TTE 03/2022 EF 74%, min MR/TR\par ETT 03/2022 no ischemic changes, poor exercise tolerance, elevated BP\par \par PMH: HTN, HLD, preDM, BMI 43, narcolepsy, hyperfibrinolysis\par Family hx: father CABG 60, grandfather CABG\par Smoking status: quit 2020\par social ETOH \par no drug use\par Current exercise: none \par Daily water intake: "not known"\par Daily caffeine intake: 2 cups coffee\par OTC medications: MVI, fishoil, vit D, probiotic\par \par \par

## 2022-05-13 ENCOUNTER — NON-APPOINTMENT (OUTPATIENT)
Age: 42
End: 2022-05-13

## 2022-06-03 DIAGNOSIS — G47.33 OBSTRUCTIVE SLEEP APNEA (ADULT) (PEDIATRIC): ICD-10-CM

## 2022-06-13 DIAGNOSIS — G47.33 OBSTRUCTIVE SLEEP APNEA (ADULT) (PEDIATRIC): ICD-10-CM

## 2022-06-30 ENCOUNTER — NON-APPOINTMENT (OUTPATIENT)
Age: 42
End: 2022-06-30

## 2022-06-30 ENCOUNTER — APPOINTMENT (OUTPATIENT)
Dept: OPHTHALMOLOGY | Facility: CLINIC | Age: 42
End: 2022-06-30

## 2022-06-30 PROCEDURE — 92004 COMPRE OPH EXAM NEW PT 1/>: CPT

## 2022-07-27 ENCOUNTER — RX RENEWAL (OUTPATIENT)
Age: 42
End: 2022-07-27

## 2022-07-29 ENCOUNTER — APPOINTMENT (OUTPATIENT)
Dept: OPHTHALMOLOGY | Facility: CLINIC | Age: 42
End: 2022-07-29

## 2022-07-29 ENCOUNTER — NON-APPOINTMENT (OUTPATIENT)
Age: 42
End: 2022-07-29

## 2022-07-29 PROCEDURE — 92012 INTRM OPH EXAM EST PATIENT: CPT

## 2022-07-29 PROCEDURE — 92015 DETERMINE REFRACTIVE STATE: CPT

## 2022-08-05 ENCOUNTER — APPOINTMENT (OUTPATIENT)
Dept: FAMILY MEDICINE | Facility: CLINIC | Age: 42
End: 2022-08-05

## 2022-08-05 VITALS
OXYGEN SATURATION: 97 % | DIASTOLIC BLOOD PRESSURE: 80 MMHG | HEIGHT: 68 IN | SYSTOLIC BLOOD PRESSURE: 140 MMHG | HEART RATE: 78 BPM | WEIGHT: 295 LBS | BODY MASS INDEX: 44.71 KG/M2 | TEMPERATURE: 97.4 F

## 2022-08-05 VITALS — SYSTOLIC BLOOD PRESSURE: 120 MMHG | DIASTOLIC BLOOD PRESSURE: 70 MMHG

## 2022-08-05 DIAGNOSIS — H53.9 UNSPECIFIED VISUAL DISTURBANCE: ICD-10-CM

## 2022-08-05 PROCEDURE — 99214 OFFICE O/P EST MOD 30 MIN: CPT

## 2022-08-05 RX ORDER — LOSARTAN POTASSIUM 50 MG/1
50 TABLET, FILM COATED ORAL
Refills: 0 | Status: DISCONTINUED | COMMUNITY
Start: 2022-01-03 | End: 2022-08-05

## 2022-08-05 NOTE — ASSESSMENT
[FreeTextEntry1] : Visual disturbance\par - following with ophthalmology \par - check blood work\par - check MRI of head/brain\par - consider neurology evaluation \par \par HTN\par - stable\par - c/w current medications\par \par Hyperlipidemia\par - c/w Rosuvastatin\par - c/w diet and exercise as tolerated\par - check blood work\par \par Prediabetes\par - c/w diet and exercise as tolerated\par - check blood work

## 2022-08-05 NOTE — PHYSICAL EXAM
[No Acute Distress] : no acute distress [Well Nourished] : well nourished [Well Developed] : well developed [Well-Appearing] : well-appearing [Normal Sclera/Conjunctiva] : normal sclera/conjunctiva [PERRL] : pupils equal round and reactive to light [Normal Appearance] : was normal in appearance [Neck Supple] : was supple [No Respiratory Distress] : no respiratory distress  [Clear to Auscultation] : lungs were clear to auscultation bilaterally [Normal Rate] : normal rate  [Regular Rhythm] : with a regular rhythm [Normal S1, S2] : normal S1 and S2 [No Murmur] : no murmur heard [No Edema] : there was no peripheral edema [No Focal Deficits] : no focal deficits [Alert and Oriented x3] : oriented to person, place, and time [Normal Mood] : the mood was normal

## 2022-08-08 ENCOUNTER — RX RENEWAL (OUTPATIENT)
Age: 42
End: 2022-08-08

## 2022-08-09 ENCOUNTER — TRANSCRIPTION ENCOUNTER (OUTPATIENT)
Age: 42
End: 2022-08-09

## 2022-08-11 ENCOUNTER — TRANSCRIPTION ENCOUNTER (OUTPATIENT)
Age: 42
End: 2022-08-11

## 2022-08-11 LAB
25(OH)D3 SERPL-MCNC: 32.4 NG/ML
ALBUMIN SERPL ELPH-MCNC: 4.9 G/DL
ALP BLD-CCNC: 73 U/L
ALT SERPL-CCNC: 21 U/L
ANION GAP SERPL CALC-SCNC: 11 MMOL/L
APPEARANCE: CLEAR
AST SERPL-CCNC: 20 U/L
BACTERIA: NEGATIVE
BASOPHILS # BLD AUTO: 0.04 K/UL
BASOPHILS NFR BLD AUTO: 0.5 %
BILIRUB SERPL-MCNC: 0.4 MG/DL
BILIRUBIN URINE: NEGATIVE
BLOOD URINE: NEGATIVE
BUN SERPL-MCNC: 18 MG/DL
CALCIUM SERPL-MCNC: 10.4 MG/DL
CHLORIDE SERPL-SCNC: 100 MMOL/L
CHOLEST SERPL-MCNC: 195 MG/DL
CO2 SERPL-SCNC: 31 MMOL/L
COLOR: YELLOW
CREAT SERPL-MCNC: 0.84 MG/DL
EGFR: 113 ML/MIN/1.73M2
EOSINOPHIL # BLD AUTO: 0.17 K/UL
EOSINOPHIL NFR BLD AUTO: 2 %
ESTIMATED AVERAGE GLUCOSE: 128 MG/DL
FOLATE SERPL-MCNC: >20 NG/ML
GLUCOSE QUALITATIVE U: NEGATIVE
GLUCOSE SERPL-MCNC: 119 MG/DL
HBA1C MFR BLD HPLC: 6.1 %
HCT VFR BLD CALC: 42.6 %
HDLC SERPL-MCNC: 45 MG/DL
HGB BLD-MCNC: 13.4 G/DL
HYALINE CASTS: 0 /LPF
IMM GRANULOCYTES NFR BLD AUTO: 0.2 %
INSULIN P FAST SERPL-ACNC: 28.6 UU/ML
KETONES URINE: NEGATIVE
LDLC SERPL CALC-MCNC: 71 MG/DL
LEUKOCYTE ESTERASE URINE: NEGATIVE
LYMPHOCYTES # BLD AUTO: 2.01 K/UL
LYMPHOCYTES NFR BLD AUTO: 23.5 %
MAGNESIUM SERPL-MCNC: 2 MG/DL
MAN DIFF?: NORMAL
MCHC RBC-ENTMCNC: 28.8 PG
MCHC RBC-ENTMCNC: 31.5 GM/DL
MCV RBC AUTO: 91.4 FL
MICROSCOPIC-UA: NORMAL
MONOCYTES # BLD AUTO: 0.67 K/UL
MONOCYTES NFR BLD AUTO: 7.8 %
NEUTROPHILS # BLD AUTO: 5.63 K/UL
NEUTROPHILS NFR BLD AUTO: 66 %
NITRITE URINE: NEGATIVE
NONHDLC SERPL-MCNC: 150 MG/DL
PH URINE: 6
PLATELET # BLD AUTO: 399 K/UL
POTASSIUM SERPL-SCNC: 4 MMOL/L
PROT SERPL-MCNC: 7.4 G/DL
PROTEIN URINE: NORMAL
RBC # BLD: 4.66 M/UL
RBC # FLD: 13.2 %
RED BLOOD CELLS URINE: 2 /HPF
SODIUM SERPL-SCNC: 143 MMOL/L
SPECIFIC GRAVITY URINE: 1.03
SQUAMOUS EPITHELIAL CELLS: 0 /HPF
T4 FREE SERPL-MCNC: 1 NG/DL
TRIGL SERPL-MCNC: 396 MG/DL
TSH SERPL-ACNC: 1.67 UIU/ML
UROBILINOGEN URINE: NORMAL
VIT B12 SERPL-MCNC: 476 PG/ML
WBC # FLD AUTO: 8.54 K/UL
WHITE BLOOD CELLS URINE: 1 /HPF

## 2022-08-12 ENCOUNTER — TRANSCRIPTION ENCOUNTER (OUTPATIENT)
Age: 42
End: 2022-08-12

## 2022-08-12 ENCOUNTER — OUTPATIENT (OUTPATIENT)
Dept: OUTPATIENT SERVICES | Facility: HOSPITAL | Age: 42
LOS: 1 days | End: 2022-08-12
Payer: COMMERCIAL

## 2022-08-12 ENCOUNTER — APPOINTMENT (OUTPATIENT)
Dept: MRI IMAGING | Facility: CLINIC | Age: 42
End: 2022-08-12

## 2022-08-12 DIAGNOSIS — H53.9 UNSPECIFIED VISUAL DISTURBANCE: ICD-10-CM

## 2022-08-12 PROCEDURE — 70553 MRI BRAIN STEM W/O & W/DYE: CPT

## 2022-08-12 PROCEDURE — 70553 MRI BRAIN STEM W/O & W/DYE: CPT | Mod: 26

## 2022-08-12 PROCEDURE — A9585: CPT

## 2022-08-15 ENCOUNTER — TRANSCRIPTION ENCOUNTER (OUTPATIENT)
Age: 42
End: 2022-08-15

## 2022-08-29 ENCOUNTER — TRANSCRIPTION ENCOUNTER (OUTPATIENT)
Age: 42
End: 2022-08-29

## 2022-08-29 RX ORDER — CHLORTHALIDONE 50 MG/1
50 TABLET ORAL DAILY
Qty: 90 | Refills: 3 | Status: DISCONTINUED | COMMUNITY
Start: 2022-01-17 | End: 2022-08-29

## 2022-10-05 ENCOUNTER — NON-APPOINTMENT (OUTPATIENT)
Age: 42
End: 2022-10-05

## 2022-10-13 ENCOUNTER — RX RENEWAL (OUTPATIENT)
Age: 42
End: 2022-10-13

## 2022-10-25 ENCOUNTER — TRANSCRIPTION ENCOUNTER (OUTPATIENT)
Age: 42
End: 2022-10-25

## 2022-11-11 ENCOUNTER — APPOINTMENT (OUTPATIENT)
Dept: FAMILY MEDICINE | Facility: CLINIC | Age: 42
End: 2022-11-11

## 2022-11-11 VITALS
HEIGHT: 68 IN | HEART RATE: 77 BPM | WEIGHT: 272 LBS | DIASTOLIC BLOOD PRESSURE: 70 MMHG | OXYGEN SATURATION: 98 % | SYSTOLIC BLOOD PRESSURE: 110 MMHG | BODY MASS INDEX: 41.22 KG/M2 | TEMPERATURE: 97.1 F

## 2022-11-11 VITALS — DIASTOLIC BLOOD PRESSURE: 70 MMHG | SYSTOLIC BLOOD PRESSURE: 120 MMHG

## 2022-11-11 DIAGNOSIS — E66.01 MORBID (SEVERE) OBESITY DUE TO EXCESS CALORIES: ICD-10-CM

## 2022-11-11 DIAGNOSIS — Z01.84 ENCOUNTER FOR ANTIBODY RESPONSE EXAMINATION: ICD-10-CM

## 2022-11-11 DIAGNOSIS — Z23 ENCOUNTER FOR IMMUNIZATION: ICD-10-CM

## 2022-11-11 DIAGNOSIS — Z00.00 ENCOUNTER FOR GENERAL ADULT MEDICAL EXAMINATION W/OUT ABNORMAL FINDINGS: ICD-10-CM

## 2022-11-11 DIAGNOSIS — Z11.1 ENCOUNTER FOR SCREENING FOR RESPIRATORY TUBERCULOSIS: ICD-10-CM

## 2022-11-11 PROCEDURE — 90686 IIV4 VACC NO PRSV 0.5 ML IM: CPT

## 2022-11-11 PROCEDURE — 99214 OFFICE O/P EST MOD 30 MIN: CPT | Mod: 25

## 2022-11-11 PROCEDURE — 90715 TDAP VACCINE 7 YRS/> IM: CPT

## 2022-11-11 PROCEDURE — G0008: CPT

## 2022-11-11 PROCEDURE — 90472 IMMUNIZATION ADMIN EACH ADD: CPT

## 2022-11-11 PROCEDURE — 99396 PREV VISIT EST AGE 40-64: CPT | Mod: 25

## 2022-11-11 RX ORDER — SEMAGLUTIDE 1.34 MG/ML
2 INJECTION, SOLUTION SUBCUTANEOUS
Qty: 4 | Refills: 2 | Status: DISCONTINUED | COMMUNITY
Start: 2022-08-12 | End: 2022-11-11

## 2022-11-11 NOTE — PHYSICAL EXAM
[No Acute Distress] : no acute distress [Well Nourished] : well nourished [Well Developed] : well developed [Well-Appearing] : well-appearing [Normal Sclera/Conjunctiva] : normal sclera/conjunctiva [PERRL] : pupils equal round and reactive to light [Normal Outer Ear/Nose] : the outer ears and nose were normal in appearance [Normal TMs] : both tympanic membranes were normal [Normal Appearance] : was normal in appearance [Neck Supple] : was supple [Normal] : the thyroid was normal [No Respiratory Distress] : no respiratory distress  [Clear to Auscultation] : lungs were clear to auscultation bilaterally [Normal Rate] : normal rate  [Regular Rhythm] : with a regular rhythm [Normal S1, S2] : normal S1 and S2 [No Murmur] : no murmur heard [No Carotid Bruits] : no carotid bruits [No Edema] : there was no peripheral edema [Soft] : abdomen soft [Non Tender] : non-tender [Normal Bowel Sounds] : normal bowel sounds [Normal Posterior Cervical Nodes] : no posterior cervical lymphadenopathy [Normal Anterior Cervical Nodes] : no anterior cervical lymphadenopathy [No Rash] : no rash [No Focal Deficits] : no focal deficits [Normal Affect] : the affect was normal [Alert and Oriented x3] : oriented to person, place, and time [Normal Mood] : the mood was normal

## 2022-11-14 ENCOUNTER — APPOINTMENT (OUTPATIENT)
Dept: PULMONOLOGY | Facility: CLINIC | Age: 42
End: 2022-11-14

## 2022-11-14 ENCOUNTER — APPOINTMENT (OUTPATIENT)
Dept: CARDIOLOGY | Facility: CLINIC | Age: 42
End: 2022-11-14

## 2022-11-14 VITALS
BODY MASS INDEX: 40.62 KG/M2 | RESPIRATION RATE: 16 BRPM | OXYGEN SATURATION: 98 % | WEIGHT: 268 LBS | DIASTOLIC BLOOD PRESSURE: 73 MMHG | HEIGHT: 68 IN | HEART RATE: 81 BPM | TEMPERATURE: 97.8 F | SYSTOLIC BLOOD PRESSURE: 130 MMHG

## 2022-11-14 VITALS
BODY MASS INDEX: 40.62 KG/M2 | HEIGHT: 68 IN | OXYGEN SATURATION: 98 % | DIASTOLIC BLOOD PRESSURE: 75 MMHG | HEART RATE: 66 BPM | SYSTOLIC BLOOD PRESSURE: 116 MMHG | WEIGHT: 268 LBS

## 2022-11-14 DIAGNOSIS — G47.419 NARCOLEPSY W/OUT CATAPLEXY: ICD-10-CM

## 2022-11-14 PROCEDURE — 99214 OFFICE O/P EST MOD 30 MIN: CPT

## 2022-11-14 RX ORDER — PSYLLIUM HUSK 0.4 G
CAPSULE ORAL
Refills: 0 | Status: ACTIVE | COMMUNITY

## 2022-11-14 RX ORDER — ROSUVASTATIN CALCIUM 5 MG/1
5 TABLET, FILM COATED ORAL DAILY
Qty: 90 | Refills: 1 | Status: DISCONTINUED | COMMUNITY
Start: 2022-01-17 | End: 2022-11-14

## 2022-11-14 RX ORDER — B-COMPLEX WITH VITAMIN C
TABLET ORAL
Refills: 0 | Status: ACTIVE | COMMUNITY

## 2022-11-14 RX ORDER — DICLOFENAC SODIUM 75 MG/1
75 TABLET, DELAYED RELEASE ORAL
Qty: 60 | Refills: 0 | Status: DISCONTINUED | COMMUNITY
Start: 2022-02-14 | End: 2022-11-14

## 2022-11-14 RX ORDER — MULTIVITAMIN
TABLET ORAL
Refills: 0 | Status: ACTIVE | COMMUNITY

## 2022-11-14 NOTE — ASSESSMENT
[Obesity, Class III, BMI 40-49.9 (E66.01)] : macrophage activation syndrome [FreeTextEntry1] : 43 y/o M with narcolepsy without cataplexy--MSOL 1.5 minutes + 4 SOREMS, on Armodafinil 150 mg daily in AM, and severe HANS--AHI 85.8/hr with T 90 of 78.2% secondary to weight gain in May, presents after initiating CPAP therapy. \par \par #1 1. SEVERE HANS --AHI 85.8/HR, TREATED WITH CPAP 16 CMH20\par --The patient is deriving benefit with CPAP, using a FFM, with normalization of AHI to 0.7/hr, with resolution to apnea-related symptoms. Therefore, he will continue with nightly CPAP use, as prescribed. \par ----Therapy and compliance data shows a 100% of nightly use, averaging 5-hrs and 39 minutes. No significant mask leak seen. \par --Rx for renewal of pap supplies was placed to [facility]. \par \par #2. OBESITY CLASS III -Weight loss strongly encouraged. The patient verbalized understanding on the role of obesity and weight loss as it relates to HANS. \par \par #3 NARCOLEPSY WITHOUT CATAPLEXY--BP well controlled-- on HTN medications. LFTS WNL from recent labs\par --Continue with Armodafinil 150 mg daily in AM ---Rx sent to patient's pharmacy. \par --The patient was cautioned on the importance of avoiding drowsy driving.\par \par -- He is asked to also increase his sleep time to 7-8 hours / night as much as feasible as he is only getting 4-6 hours of sleep at night due to his school and work schedule. Dangers of insufficient sleep time and strategies to improve this was discussed with the patient. \par \par F/U in 6 to 12 months or sooner if needed.

## 2022-11-14 NOTE — HISTORY OF PRESENT ILLNESS
[CPAP: ___ cmH2O] : CPAP: [unfilled] cmH2O [FreeTextEntry1] : 41 y/o M with narcolepsy, initially diagnosed in 2006/2007 with PSG / MSLT that showed an MSOL 1.5 minutes with 4 SOREMS, currently, on Armodafinil 150 mg daily, found to have severe HANS--AHI 85.8/hr after weight gain in May, presents after initiating treatment with CPAP. \par \par With nightly use of CPAP, the patient notes an improvement in sleep quality and feels rested. He denies AM headaches. The full face mask and pressure of 16 cmH20 are well tolerated. The patient continues to take Armodafinil 150 mg in AM for narcolepsy without cataplexy and persistent somnolence despite effective CPAP therapy. 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 However, the patient has been without Armodafinil for 2-weeks, and reports drowsy driving with no recent MVA's. He is requesting a renewal of Armodafinil 150 mg daily. Currently, without Armodafinil, his ESS score is 14, but patient states when he is on Armodafinil, his ESS score is a 7. BP has been stable and well controlled. Denies cataplexy.\par \par Patient reports sleeping less, about 4 to 6 hours / night related to being in school to become an NP, and working full-time as an RN. He feels more refreshed if he can attain 6 to 8 hours of sleep / night, which he has difficulty obtaining due to his schedule and school demands. \par \par MEDICATION HX: He was started on Modafinil 200 mg BID with improvement in his symptoms in 2006 but had lost his insurance in 2008 and was not treated. He was also given Xyrem but reported feeling more somnolent so stopped taking it. \par \par 10 - 15 lb weight loss reported since being on Ozempic for DM. No other interim changes to his health reported. \par COMORBID medical conditions include: HTN, HLD, preDM, Obesity Class III,\par \par 5/5/22 Latest PSG: AHI 85.8/hr, with a T 90 of 78.2%\par 5/9/22 CPAP Titration 16 cmh20 with F&P Simplus FFM size M\par 11/20/2007 MSLT showed MSOL of 1.5 minutes + 4 SOREMS, with PSG showing an AHI of 2.7/hr  [Nocturnal Oxygen] : The patient does not use nocturnal oxygen [ESS] : 14

## 2022-11-14 NOTE — REVIEW OF SYSTEMS
[Fatigue] : fatigue [Obesity] : obesity [Diabetes] : diabetes  [Arthralgias] : arthralgias [Unintentional Sleep while inactive] : unintentional sleep while inactive [Awakes Unrefreshed] : nonrestorative sleep [Recent Wt Loss (___ Lbs)] : recent [unfilled] ~Ulb weight loss [Hypersomnolence] : sleeping much more than usual [Nasal Congestion] : no nasal congestion [Postnasal Drip] : no postnasal drip [A.M. Dry Mouth] : no a.m. dry mouth [Chest Pain] : no chest pain [CHF] : no congestive heart failure [Thyroid Disease] : no thyroid disease [History of Iron Deficiency] : no history of iron deficiency [A.M. Headache] : no headache present upon awakening [Heartburn] : no heartburn [Nocturia] : no nocturia [Depression] : no depression [Anxious] : not anxious [Snoring] : no snoring [Witnessed Apneas] : demonstrated no ~M apnea [Frequent Nocturnal Awakenings] : no nocturnal awakenings from sleep [Unintentional Sleep while active] : no unintentional sleep while active [Awakes With Headache] : awakes without a headache [Awakes With Dry Mouth] : awakes without dry mouth [Difficulty Initiating Sleep] : no difficulty falling asleep [Difficulty Maintaining Sleep] : no difficulty maintaining sleep [Irresistible urge to move legs] : no irresistible urge to move legs because of lower extremity discomfort [LE discomfort relieved by movement] : lower extremity discomfort not relieved by movement [Sleep Disturbances due to LE symptoms] : ~T no sleep disturbances due to lower extremity symptoms [Unusual Sleep Behavior] : no unusual sleep behavior [Cataplexy] :  no cataplexy [Hypnogogic Hallucinations] : no hypnogogic hallucinations [Hypnopompic Hallucinations] : no hypnopompic hallucinations [Sleep Paralysis] : no sleep paralysis [FreeTextEntry1] : 12 am [FreeTextEntry2] : 7 am [FreeTextEntry3] : brief [FreeTextEntry4] : 0 [FreeTextEntry5] : n/a [FreeTextEntry6] : 4-6 hours [FreeTextEntry7] : no scheduled naps

## 2022-11-14 NOTE — DISCUSSION/SUMMARY
[FreeTextEntry1] : Pt is a 43 y/o M PMH HTN, HLD, preDM, BMI 43, narcolepsy, HANS on CPAP\par \par HTN:\par now well controlled\par c/w norvasc, losartan 100mg (advised not to adjust doses on his own)\par pt to start nebivolol\par Advised low salt diet, regular exercise, weight loss \par \par HLD:\par not on statin now\par Advised lifestyle modifications \par most recent labs reviewed\par will see what next lipid panel shows\par \par HANS:\par following with pulm\par uses CPAP\par \par Pt will return in 6 mnths or sooner as needed but I encouraged communication via phone or portal if necessary. \par The described plan was discussed with the pt.  All questions and concerns were addressed to the best of my knowledge.

## 2022-11-14 NOTE — PHYSICAL EXAM
[Normal Appearance] : normal appearance [General Appearance - In No Acute Distress] : no acute distress [Normal Conjunctiva] : the conjunctiva exhibited no abnormalities [III] : III [Heart Rate And Rhythm] : heart rate was normal and rhythm regular [Neck Appearance] : the appearance of the neck was normal [Heart Sounds] : normal S1 and S2 [] : no respiratory distress [Respiration, Rhythm And Depth] : normal respiratory rhythm and effort [Exaggerated Use Of Accessory Muscles For Inspiration] : no accessory muscle use [Auscultation Breath Sounds / Voice Sounds] : lungs were clear to auscultation bilaterally [Involuntary Movements] : no involuntary movements were seen [Cyanosis, Localized] : no localized cyanosis [No Focal Deficits] : no focal deficits [Oriented To Time, Place, And Person] : oriented to person, place, and time [Impaired Insight] : insight and judgment were intact [Affect] : the affect was normal [Mood] : the mood was normal

## 2022-11-14 NOTE — HISTORY OF PRESENT ILLNESS
[FreeTextEntry1] : Pt is a 43 y/o M PMH HTN, HLD, preDM, BMI 43, narcolepsy, HANS - following with pulm - now on CPAP \par Pt reports feeling well and has no active cardiac complaints - denies CP, SOB, palpitations, dizziness, syncope, edema, orthopnea, PND, orthopnea.  No exertional symptoms. \par He notes that he had visual changes on chlorthalidone and has stopped taking it with resolution of his symptoms\par He has increased losartan to 150mg qd on his own\par crestor caused myalgias\par \par TTE 03/2022 EF 74%, min MR/TR\par ETT 03/2022 no ischemic changes, poor exercise tolerance, elevated BP\par \par PMH: HTN, HLD, preDM, BMI 43, narcolepsy, hyperfibrinolysis\par Family hx: father CABG 60, grandfather CABG\par Smoking status: quit 2020\par social ETOH \par no drug use\par Current exercise: none \par Daily water intake: "not known"\par Daily caffeine intake: 2 cups coffee\par OTC medications: MVI, fishoil, vit D, probiotic\par \par \par

## 2022-11-16 ENCOUNTER — TRANSCRIPTION ENCOUNTER (OUTPATIENT)
Age: 42
End: 2022-11-16

## 2022-11-16 LAB
ALBUMIN SERPL ELPH-MCNC: 5 G/DL
ALP BLD-CCNC: 80 U/L
ALT SERPL-CCNC: 16 U/L
ANION GAP SERPL CALC-SCNC: 14 MMOL/L
AST SERPL-CCNC: 13 U/L
BASOPHILS # BLD AUTO: 0.02 K/UL
BASOPHILS NFR BLD AUTO: 0.2 %
BILIRUB SERPL-MCNC: 0.3 MG/DL
BUN SERPL-MCNC: 16 MG/DL
CALCIUM SERPL-MCNC: 9.5 MG/DL
CHLORIDE SERPL-SCNC: 101 MMOL/L
CO2 SERPL-SCNC: 24 MMOL/L
CREAT SERPL-MCNC: 0.77 MG/DL
EGFR: 116 ML/MIN/1.73M2
EOSINOPHIL # BLD AUTO: 0.13 K/UL
EOSINOPHIL NFR BLD AUTO: 1.5 %
ESTIMATED AVERAGE GLUCOSE: 117 MG/DL
GLUCOSE SERPL-MCNC: 66 MG/DL
HBA1C MFR BLD HPLC: 5.7 %
HBV SURFACE AB SERPL IA-ACNC: >1000 MIU/ML
HCT VFR BLD CALC: 41.9 %
HGB BLD-MCNC: 13.7 G/DL
IMM GRANULOCYTES NFR BLD AUTO: 0.2 %
LYMPHOCYTES # BLD AUTO: 2.24 K/UL
LYMPHOCYTES NFR BLD AUTO: 25.1 %
M TB IFN-G BLD-IMP: NEGATIVE
MAN DIFF?: NORMAL
MCHC RBC-ENTMCNC: 29.3 PG
MCHC RBC-ENTMCNC: 32.7 GM/DL
MCV RBC AUTO: 89.5 FL
MEV IGG FLD QL IA: 53.3 AU/ML
MEV IGG+IGM SER-IMP: POSITIVE
MONOCYTES # BLD AUTO: 0.55 K/UL
MONOCYTES NFR BLD AUTO: 6.2 %
MUV AB SER-ACNC: NEGATIVE
MUV IGG SER QL IA: 6 AU/ML
NEUTROPHILS # BLD AUTO: 5.97 K/UL
NEUTROPHILS NFR BLD AUTO: 66.8 %
PLATELET # BLD AUTO: 398 K/UL
POTASSIUM SERPL-SCNC: 4.3 MMOL/L
PROT SERPL-MCNC: 7.3 G/DL
QUANTIFERON TB PLUS MITOGEN MINUS NIL: >10 IU/ML
QUANTIFERON TB PLUS NIL: 0.02 IU/ML
QUANTIFERON TB PLUS TB1 MINUS NIL: 0 IU/ML
QUANTIFERON TB PLUS TB2 MINUS NIL: 0 IU/ML
RBC # BLD: 4.68 M/UL
RBC # FLD: 13.2 %
RUBV IGG FLD-ACNC: 5.3 INDEX
RUBV IGG SER-IMP: POSITIVE
SODIUM SERPL-SCNC: 140 MMOL/L
VZV AB TITR SER: POSITIVE
VZV IGG SER IF-ACNC: 816.3 INDEX
WBC # FLD AUTO: 8.93 K/UL

## 2022-11-23 ENCOUNTER — MED ADMIN CHARGE (OUTPATIENT)
Age: 42
End: 2022-11-23

## 2022-11-23 ENCOUNTER — APPOINTMENT (OUTPATIENT)
Dept: FAMILY MEDICINE | Facility: CLINIC | Age: 42
End: 2022-11-23

## 2022-11-23 DIAGNOSIS — Z23 ENCOUNTER FOR IMMUNIZATION: ICD-10-CM

## 2022-11-23 PROCEDURE — 90471 IMMUNIZATION ADMIN: CPT

## 2022-11-23 PROCEDURE — 90707 MMR VACCINE SC: CPT

## 2022-11-28 NOTE — HISTORY OF PRESENT ILLNESS
[FreeTextEntry1] : Annual physical  [de-identified] : 42 year old male presents for an annual physical \par \par He is in school- [employer]\par needs forms completed\par needs TB screening\par needs titers checked\par \par agrees for a flu vaccine and Tdap booster\par no previous vaccination reactions\par no fever\par \par has HTN- currently on Losartan 100 mg daily (he admits to taking 150 mg daily- increased dose on his own), Amlodipine 10 mg daily \par \par has Hyperlipidemia- patient self d/c Rosuvastatin due to myalgias \par \par has narcolepsy, HANS- on CPAP, on Armodafinil \par follows with pulmonology \par \par currently on Ozempic for Prediabetes, Obesity\par has lost 25 pounds\par \par has had cardiology evaluation \par exercise stress test negative in March 2022

## 2022-11-28 NOTE — ASSESSMENT
[FreeTextEntry1] : Health care maintenance:\par check blood work, blood drawn in office\par follow up with optometry/ophthalmology and dentist for routine exams when due\par Flu vaccine given, patient tolerated well\par Tdap given, patient tolerated well \par \par HTN:\par BP stable\par advised maximum dose of Losartan is 100 mg daily, not to take 150 mg\par c/w Amlodipine\par start Nebivolol 5 mg daily\par continue to monitor BP\par f/u with cardiology\par \par Hyperlipidemia:\par patient self d/c Rosuvastatin due to myalgias\par c/w diet and exercise as tolerated\par monitor\par \par Morbid obesity:\par c/w diet and exercise\par c/w Ozempic \par \par Prediabetes:\par c/w diet and exercise\par c/w Ozempic\par check blood work\par

## 2022-11-28 NOTE — HEALTH RISK ASSESSMENT
[Yes] : Yes [Former] : Former [2 - 4 times a month (2 pts)] : 2-4 times a month (2 points) [1 or 2 (0 pts)] : 1 or 2 (0 points) [Never (0 pts)] : Never (0 points) [No] : In the past 12 months have you used drugs other than those required for medical reasons? No [de-identified] : smoked x 15 years, 4- 5 cigarettes a day [YearQuit] : 2020 [Audit-CScore] : 2 [de-identified] : Going to the gym- averages 5 days a week [de-identified] : Diet has improved

## 2022-11-29 ENCOUNTER — APPOINTMENT (OUTPATIENT)
Dept: OPHTHALMOLOGY | Facility: CLINIC | Age: 42
End: 2022-11-29

## 2022-11-30 ENCOUNTER — TRANSCRIPTION ENCOUNTER (OUTPATIENT)
Age: 42
End: 2022-11-30

## 2022-12-01 ENCOUNTER — TRANSCRIPTION ENCOUNTER (OUTPATIENT)
Age: 42
End: 2022-12-01

## 2022-12-12 ENCOUNTER — APPOINTMENT (OUTPATIENT)
Dept: FAMILY MEDICINE | Facility: CLINIC | Age: 42
End: 2022-12-12

## 2022-12-12 ENCOUNTER — TRANSCRIPTION ENCOUNTER (OUTPATIENT)
Age: 42
End: 2022-12-12

## 2022-12-12 LAB
CHOLEST SERPL-MCNC: 187 MG/DL
HDLC SERPL-MCNC: 41 MG/DL
INSULIN P FAST SERPL-ACNC: 12 UU/ML
LDLC SERPL CALC-MCNC: 104 MG/DL
NONHDLC SERPL-MCNC: 145 MG/DL
TRIGL SERPL-MCNC: 207 MG/DL

## 2023-01-18 ENCOUNTER — RX RENEWAL (OUTPATIENT)
Age: 43
End: 2023-01-18

## 2023-02-27 ENCOUNTER — TRANSCRIPTION ENCOUNTER (OUTPATIENT)
Age: 43
End: 2023-02-27

## 2023-03-16 ENCOUNTER — TRANSCRIPTION ENCOUNTER (OUTPATIENT)
Age: 43
End: 2023-03-16

## 2023-03-23 ENCOUNTER — APPOINTMENT (OUTPATIENT)
Dept: CT IMAGING | Facility: CLINIC | Age: 43
End: 2023-03-23
Payer: COMMERCIAL

## 2023-03-23 ENCOUNTER — OUTPATIENT (OUTPATIENT)
Dept: OUTPATIENT SERVICES | Facility: HOSPITAL | Age: 43
LOS: 1 days | End: 2023-03-23
Payer: COMMERCIAL

## 2023-03-23 ENCOUNTER — APPOINTMENT (OUTPATIENT)
Dept: INTERNAL MEDICINE | Facility: CLINIC | Age: 43
End: 2023-03-23
Payer: COMMERCIAL

## 2023-03-23 ENCOUNTER — RESULT REVIEW (OUTPATIENT)
Age: 43
End: 2023-03-23

## 2023-03-23 VITALS
SYSTOLIC BLOOD PRESSURE: 121 MMHG | HEART RATE: 62 BPM | BODY MASS INDEX: 40.62 KG/M2 | OXYGEN SATURATION: 97 % | TEMPERATURE: 98.3 F | HEIGHT: 68 IN | WEIGHT: 268 LBS | DIASTOLIC BLOOD PRESSURE: 79 MMHG

## 2023-03-23 DIAGNOSIS — R10.31 RIGHT LOWER QUADRANT PAIN: ICD-10-CM

## 2023-03-23 PROCEDURE — 74177 CT ABD & PELVIS W/CONTRAST: CPT | Mod: 26

## 2023-03-23 PROCEDURE — 74177 CT ABD & PELVIS W/CONTRAST: CPT

## 2023-03-23 PROCEDURE — 99212 OFFICE O/P EST SF 10 MIN: CPT

## 2023-03-23 NOTE — ASSESSMENT
[FreeTextEntry1] : 1.  Apparent painful right inguinal hernia.  I cannot demonstrate the hernia on exam today.  However he is an excellent historian, medical professional, and I am concerned by the discomfort.  He does have a history of kidney stones, though this does not feel like that and though unlikely this could be diverticulitis, early appendicitis.\par \par Accordingly blood urine test CT organized.\par \par 2.  Hypertension.\par \par 3.  Hyperlipidemia.  Labs ordered per his request for his PCP.\par \par Patient appreciated her time.  Answered all questions as best I could.\par \par

## 2023-03-23 NOTE — PHYSICAL EXAM
[Soft] : abdomen soft [Non Tender] : non-tender [Non-distended] : non-distended [No Masses] : no abdominal mass palpated [No HSM] : no HSM [No Hernias] : no hernias [Normal] : affect was normal and insight and judgment were intact [de-identified] : Circumcised penis.  2 testes in the scrotum.  No substantial right testicular hydrocele or varicocele, and no adventitial masses in the scrotum, even with Valsalva. [de-identified] : No SI joint or trochanteric tenderness on the right side.

## 2023-03-23 NOTE — HISTORY OF PRESENT ILLNESS
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  No nausea vomiting fever bowel habit changes.  Does not strain to move his bowels.\par \par However, he started working out to improve his overall health in January, including weight lifting with intra-abdominal straining for a couple hours a couple times a week.  He believes this is what set off what has been chronic right hip and back pain progressing to right groin pain with scrotal fullness.  This has been intermittently uncomfortable however the scrotal mass does reduce.\par \par He asks for A1c and cholesterol for his PCP.

## 2023-03-24 ENCOUNTER — NON-APPOINTMENT (OUTPATIENT)
Age: 43
End: 2023-03-24

## 2023-03-27 ENCOUNTER — NON-APPOINTMENT (OUTPATIENT)
Age: 43
End: 2023-03-27

## 2023-03-27 LAB
ALBUMIN SERPL ELPH-MCNC: 4.5 G/DL
ALP BLD-CCNC: 75 U/L
ALT SERPL-CCNC: 13 U/L
ANION GAP SERPL CALC-SCNC: 11 MMOL/L
APPEARANCE: CLEAR
AST SERPL-CCNC: 14 U/L
BACTERIA UR CULT: NORMAL
BACTERIA: NEGATIVE
BASOPHILS # BLD AUTO: 0.02 K/UL
BASOPHILS NFR BLD AUTO: 0.3 %
BILIRUB SERPL-MCNC: 0.2 MG/DL
BILIRUBIN URINE: NEGATIVE
BLOOD URINE: NEGATIVE
BUN SERPL-MCNC: 15 MG/DL
CALCIUM SERPL-MCNC: 9.7 MG/DL
CHLORIDE SERPL-SCNC: 102 MMOL/L
CHOLEST SERPL-MCNC: 204 MG/DL
CO2 SERPL-SCNC: 26 MMOL/L
COLOR: NORMAL
CREAT SERPL-MCNC: 1 MG/DL
CREAT SPEC-SCNC: 104 MG/DL
CREAT/PROT UR: 0.1 RATIO
CRP SERPL-MCNC: <3 MG/L
EGFR: 98 ML/MIN/1.73M2
EOSINOPHIL # BLD AUTO: 0.08 K/UL
EOSINOPHIL NFR BLD AUTO: 1 %
ESTIMATED AVERAGE GLUCOSE: 114 MG/DL
GLUCOSE QUALITATIVE U: NEGATIVE
GLUCOSE SERPL-MCNC: 103 MG/DL
HBA1C MFR BLD HPLC: 5.6 %
HCT VFR BLD CALC: 43.8 %
HDLC SERPL-MCNC: 45 MG/DL
HGB BLD-MCNC: 13.5 G/DL
HYALINE CASTS: 0 /LPF
IMM GRANULOCYTES NFR BLD AUTO: 0.4 %
KETONES URINE: NEGATIVE
LACTATE BLDA-MCNC: 1 MMOL/L
LDLC SERPL CALC-MCNC: 117 MG/DL
LEUKOCYTE ESTERASE URINE: NEGATIVE
LYMPHOCYTES # BLD AUTO: 1.58 K/UL
LYMPHOCYTES NFR BLD AUTO: 20 %
MAN DIFF?: NORMAL
MCHC RBC-ENTMCNC: 29.3 PG
MCHC RBC-ENTMCNC: 30.8 GM/DL
MCV RBC AUTO: 95 FL
MICROSCOPIC-UA: NORMAL
MONOCYTES # BLD AUTO: 0.51 K/UL
MONOCYTES NFR BLD AUTO: 6.4 %
NEUTROPHILS # BLD AUTO: 5.69 K/UL
NEUTROPHILS NFR BLD AUTO: 71.9 %
NITRITE URINE: NEGATIVE
NONHDLC SERPL-MCNC: 159 MG/DL
PH URINE: 7
PLATELET # BLD AUTO: 369 K/UL
POTASSIUM SERPL-SCNC: 4.6 MMOL/L
PROT SERPL-MCNC: 7 G/DL
PROT UR-MCNC: 7 MG/DL
PROTEIN URINE: NEGATIVE
RBC # BLD: 4.61 M/UL
RBC # FLD: 13.1 %
RED BLOOD CELLS URINE: 2 /HPF
SODIUM SERPL-SCNC: 139 MMOL/L
SPECIFIC GRAVITY URINE: 1.02
SQUAMOUS EPITHELIAL CELLS: 0 /HPF
TRIGL SERPL-MCNC: 210 MG/DL
UROBILINOGEN URINE: NORMAL
WBC # FLD AUTO: 7.91 K/UL
WHITE BLOOD CELLS URINE: 0 /HPF

## 2023-03-31 ENCOUNTER — APPOINTMENT (OUTPATIENT)
Dept: SURGERY | Facility: CLINIC | Age: 43
End: 2023-03-31
Payer: COMMERCIAL

## 2023-03-31 VITALS
DIASTOLIC BLOOD PRESSURE: 78 MMHG | BODY MASS INDEX: 38.65 KG/M2 | HEIGHT: 68 IN | SYSTOLIC BLOOD PRESSURE: 134 MMHG | OXYGEN SATURATION: 97 % | HEART RATE: 67 BPM | WEIGHT: 255 LBS

## 2023-03-31 PROCEDURE — 99213 OFFICE O/P EST LOW 20 MIN: CPT

## 2023-03-31 NOTE — ASSESSMENT
[FreeTextEntry1] : 42M with BMI 38 presents with reducible, symptomatic umbilical hernia and RIH. Recommend concomitant repair robotically.

## 2023-03-31 NOTE — HISTORY OF PRESENT ILLNESS
[de-identified] : 43yo M with history of previous evaluation for bariatric surgery (didn't proceed for surgery), BMI 38, now presents with diagnosed reducible R inguinal hernia. Describes a mass effect feeling in the R groin. Denies sharp pain. No incarceration or bowel obstructions. No tobacco or EtOH use.\par No liver dz or immunosuppression.\par Also has a small fat-containing umbilical hernia, causes occasional pain.

## 2023-03-31 NOTE — PHYSICAL EXAM
[JVD] : no jugular venous distention  [Normal Breath Sounds] : Normal breath sounds [Normal Heart Sounds] : normal heart sounds [Normal Rate and Rhythm] : normal rate and rhythm [Abdominal Masses] : No abdominal masses [No HSM] : no hepatosplenomegaly [No Rash or Lesion] : No rash or lesion [Alert] : alert [Oriented to Person] : oriented to person [Oriented to Place] : oriented to place [Oriented to Time] : oriented to time [Calm] : calm [de-identified] : well-appearing, NAD [de-identified] : MARK [de-identified] : soft, ND, NT, +BS, +reducible 1cm umbilical hernia, reducible RIH, no evidence of LIH [de-identified] : no CVAT

## 2023-04-03 ENCOUNTER — TRANSCRIPTION ENCOUNTER (OUTPATIENT)
Age: 43
End: 2023-04-03

## 2023-04-17 ENCOUNTER — RX RENEWAL (OUTPATIENT)
Age: 43
End: 2023-04-17

## 2023-05-10 ENCOUNTER — APPOINTMENT (OUTPATIENT)
Dept: FAMILY MEDICINE | Facility: CLINIC | Age: 43
End: 2023-05-10
Payer: COMMERCIAL

## 2023-05-10 VITALS
HEART RATE: 75 BPM | WEIGHT: 252 LBS | HEIGHT: 68 IN | SYSTOLIC BLOOD PRESSURE: 130 MMHG | RESPIRATION RATE: 16 BRPM | DIASTOLIC BLOOD PRESSURE: 80 MMHG | OXYGEN SATURATION: 98 % | TEMPERATURE: 98.1 F | BODY MASS INDEX: 38.19 KG/M2

## 2023-05-10 DIAGNOSIS — K40.90 UNILATERAL INGUINAL HERNIA, W/OUT OBSTRUCTION OR GANGRENE, NOT SPECIFIED AS RECURRENT: ICD-10-CM

## 2023-05-10 DIAGNOSIS — K42.9 UMBILICAL HERNIA W/OUT OBSTRUCTION OR GANGRENE: ICD-10-CM

## 2023-05-10 DIAGNOSIS — Z01.818 ENCOUNTER FOR OTHER PREPROCEDURAL EXAMINATION: ICD-10-CM

## 2023-05-10 PROCEDURE — 99214 OFFICE O/P EST MOD 30 MIN: CPT

## 2023-05-11 ENCOUNTER — OUTPATIENT (OUTPATIENT)
Dept: OUTPATIENT SERVICES | Facility: HOSPITAL | Age: 43
LOS: 1 days | End: 2023-05-11
Payer: COMMERCIAL

## 2023-05-11 VITALS
HEIGHT: 68 IN | HEART RATE: 66 BPM | TEMPERATURE: 98 F | SYSTOLIC BLOOD PRESSURE: 110 MMHG | WEIGHT: 259.04 LBS | DIASTOLIC BLOOD PRESSURE: 60 MMHG | OXYGEN SATURATION: 97 % | RESPIRATION RATE: 16 BRPM

## 2023-05-11 DIAGNOSIS — Z01.818 ENCOUNTER FOR OTHER PREPROCEDURAL EXAMINATION: ICD-10-CM

## 2023-05-11 DIAGNOSIS — Z29.9 ENCOUNTER FOR PROPHYLACTIC MEASURES, UNSPECIFIED: ICD-10-CM

## 2023-05-11 DIAGNOSIS — Z98.890 OTHER SPECIFIED POSTPROCEDURAL STATES: Chronic | ICD-10-CM

## 2023-05-11 DIAGNOSIS — G47.33 OBSTRUCTIVE SLEEP APNEA (ADULT) (PEDIATRIC): ICD-10-CM

## 2023-05-11 DIAGNOSIS — S83.511A SPRAIN OF ANTERIOR CRUCIATE LIGAMENT OF RIGHT KNEE, INITIAL ENCOUNTER: Chronic | ICD-10-CM

## 2023-05-11 DIAGNOSIS — Z86.2 PERSONAL HISTORY OF DISEASES OF THE BLOOD AND BLOOD-FORMING ORGANS AND CERTAIN DISORDERS INVOLVING THE IMMUNE MECHANISM: ICD-10-CM

## 2023-05-11 DIAGNOSIS — I10 ESSENTIAL (PRIMARY) HYPERTENSION: ICD-10-CM

## 2023-05-11 DIAGNOSIS — K40.90 UNILATERAL INGUINAL HERNIA, WITHOUT OBSTRUCTION OR GANGRENE, NOT SPECIFIED AS RECURRENT: ICD-10-CM

## 2023-05-11 LAB
A1C WITH ESTIMATED AVERAGE GLUCOSE RESULT: 5.7 % — HIGH (ref 4–5.6)
ANION GAP SERPL CALC-SCNC: 15 MMOL/L — SIGNIFICANT CHANGE UP (ref 5–17)
APTT BLD: 38.2 SEC — HIGH (ref 27.5–35.5)
BASOPHILS # BLD AUTO: 0.04 K/UL — SIGNIFICANT CHANGE UP (ref 0–0.2)
BASOPHILS NFR BLD AUTO: 0.5 % — SIGNIFICANT CHANGE UP (ref 0–2)
BLD GP AB SCN SERPL QL: SIGNIFICANT CHANGE UP
BUN SERPL-MCNC: 21.4 MG/DL — HIGH (ref 8–20)
CALCIUM SERPL-MCNC: 9.5 MG/DL — SIGNIFICANT CHANGE UP (ref 8.4–10.5)
CHLORIDE SERPL-SCNC: 102 MMOL/L — SIGNIFICANT CHANGE UP (ref 96–108)
CO2 SERPL-SCNC: 23 MMOL/L — SIGNIFICANT CHANGE UP (ref 22–29)
CREAT SERPL-MCNC: 0.78 MG/DL — SIGNIFICANT CHANGE UP (ref 0.5–1.3)
EGFR: 115 ML/MIN/1.73M2 — SIGNIFICANT CHANGE UP
EOSINOPHIL # BLD AUTO: 0.13 K/UL — SIGNIFICANT CHANGE UP (ref 0–0.5)
EOSINOPHIL NFR BLD AUTO: 1.5 % — SIGNIFICANT CHANGE UP (ref 0–6)
ESTIMATED AVERAGE GLUCOSE: 117 MG/DL — HIGH (ref 68–114)
GLUCOSE SERPL-MCNC: 97 MG/DL — SIGNIFICANT CHANGE UP (ref 70–99)
HCT VFR BLD CALC: 43.5 % — SIGNIFICANT CHANGE UP (ref 39–50)
HGB BLD-MCNC: 14.3 G/DL — SIGNIFICANT CHANGE UP (ref 13–17)
IMM GRANULOCYTES NFR BLD AUTO: 0.3 % — SIGNIFICANT CHANGE UP (ref 0–0.9)
INR BLD: 1 RATIO — SIGNIFICANT CHANGE UP (ref 0.88–1.16)
LYMPHOCYTES # BLD AUTO: 2 K/UL — SIGNIFICANT CHANGE UP (ref 1–3.3)
LYMPHOCYTES # BLD AUTO: 22.7 % — SIGNIFICANT CHANGE UP (ref 13–44)
MCHC RBC-ENTMCNC: 28.9 PG — SIGNIFICANT CHANGE UP (ref 27–34)
MCHC RBC-ENTMCNC: 32.9 GM/DL — SIGNIFICANT CHANGE UP (ref 32–36)
MCV RBC AUTO: 88.1 FL — SIGNIFICANT CHANGE UP (ref 80–100)
MONOCYTES # BLD AUTO: 0.6 K/UL — SIGNIFICANT CHANGE UP (ref 0–0.9)
MONOCYTES NFR BLD AUTO: 6.8 % — SIGNIFICANT CHANGE UP (ref 2–14)
MRSA PCR RESULT.: DETECTED
NEUTROPHILS # BLD AUTO: 6 K/UL — SIGNIFICANT CHANGE UP (ref 1.8–7.4)
NEUTROPHILS NFR BLD AUTO: 68.2 % — SIGNIFICANT CHANGE UP (ref 43–77)
PLATELET # BLD AUTO: 340 K/UL — SIGNIFICANT CHANGE UP (ref 150–400)
POTASSIUM SERPL-MCNC: 4.6 MMOL/L — SIGNIFICANT CHANGE UP (ref 3.5–5.3)
POTASSIUM SERPL-SCNC: 4.6 MMOL/L — SIGNIFICANT CHANGE UP (ref 3.5–5.3)
PROTHROM AB SERPL-ACNC: 11.6 SEC — SIGNIFICANT CHANGE UP (ref 10.5–13.4)
RBC # BLD: 4.94 M/UL — SIGNIFICANT CHANGE UP (ref 4.2–5.8)
RBC # FLD: 12.5 % — SIGNIFICANT CHANGE UP (ref 10.3–14.5)
S AUREUS DNA NOSE QL NAA+PROBE: DETECTED
SODIUM SERPL-SCNC: 139 MMOL/L — SIGNIFICANT CHANGE UP (ref 135–145)
WBC # BLD: 8.8 K/UL — SIGNIFICANT CHANGE UP (ref 3.8–10.5)
WBC # FLD AUTO: 8.8 K/UL — SIGNIFICANT CHANGE UP (ref 3.8–10.5)

## 2023-05-11 PROCEDURE — 93005 ELECTROCARDIOGRAM TRACING: CPT

## 2023-05-11 PROCEDURE — 93010 ELECTROCARDIOGRAM REPORT: CPT

## 2023-05-11 PROCEDURE — G0463: CPT

## 2023-05-11 RX ORDER — POVIDONE-IODINE 5 %
1 AEROSOL (ML) TOPICAL ONCE
Refills: 0 | Status: COMPLETED | OUTPATIENT
Start: 2023-05-13 | End: 2023-05-13

## 2023-05-11 RX ORDER — MUPIROCIN 20 MG/G
1 OINTMENT TOPICAL
Qty: 1 | Refills: 0
Start: 2023-05-11 | End: 2023-05-15

## 2023-05-11 RX ORDER — CEFAZOLIN SODIUM 1 G
2000 VIAL (EA) INJECTION ONCE
Refills: 0 | Status: DISCONTINUED | OUTPATIENT
Start: 2023-05-13 | End: 2023-05-27

## 2023-05-11 NOTE — H&P PST ADULT - OTHER CARE PROVIDERS
Dr Nava [facility], Dr Chanda ALVAREZ BLood Dr Nava [facility], Dr Armas [place] Blood [phone]

## 2023-05-11 NOTE — H&P PST ADULT - PROBLEM SELECTOR PLAN 3
Caprini score 7 High risk,  Surgical team should assess /Strongly recommend pharmacological and mechanical measures for VTE prophylaxis

## 2023-05-11 NOTE — H&P PST ADULT - PROBLEM SELECTOR PLAN 2
JEREMY-1 deficiency, patient follows with hematology, to take amicar 500mg 8 tabs day prior to surgery followed by 6 tabs daily for 5 days.  Patient verbalized understanding of instructions from hematology, evaluation pending

## 2023-05-11 NOTE — H&P PST ADULT - HISTORY OF PRESENT ILLNESS
43 year old male     Patient is scheduled robotic right inguinal hernia repair, umbilical hernia repair on 5/13/23 with Dr Yung.   43 year old male with pmhx of JEREMY-1 deficiency, HTN, HANS on CPAP,   presents with umbilical and right inguinal hernia x3 months pain is worse with driving , 5/10 in severity, not taking otc medication     Patient is scheduled robotic right inguinal hernia repair, umbilical hernia repair on 5/13/23 with Dr Yung.   43 year old male with pmhx of JEREMY-1 deficiency, HTN, HANS on CPAP, presents with umbilical and right inguinal hernia x3 months pain is worse with driving, dull aching pain, 5/10 in severity, not taking otc medication. Patient is scheduled robotic right inguinal hernia repair, umbilical hernia repair on 5/13/23 with Dr Yung.   Medical and hematology evaluation pending

## 2023-05-11 NOTE — H&P PST ADULT - PROBLEM SELECTOR PLAN 1
Patient is scheduled robotic right inguinal hernia repair, umbilical hernia repair on 5/13/23 with Dr Yung.   Medical and hematology evaluation pending

## 2023-05-11 NOTE — H&P PST ADULT - ASSESSMENT
CAPRINI SCORE    AGE RELATED RISK FACTORS                                                             [ ] Age 41-60 years                                            (1 Point)  [ ] Age: 61-74 years                                           (2 Points)                 [ ] Age= 75 years                                                (3 Points)             DISEASE RELATED RISK FACTORS                                                       [ ] Edema in the lower extremities                 (1 Point)                     [ ] Varicose veins                                               (1 Point)                                 [ ] BMI > 25 Kg/m2                                            (1 Point)                                  [ ] Serious infection (ie PNA)                            (1 Point)                     [ ] Lung disease ( COPD, Emphysema)            (1 Point)                                                                          [ ] Acute myocardial infarction                         (1 Point)                  [ ] Congestive heart failure (in the previous month)  (1 Point)         [ ] Inflammatory bowel disease                            (1 Point)                  [ ] Central venous access, PICC or Port               (2 points)       (within the last month)                                                                [ ] Stroke (in the previous month)                        (5 Points)    [ ] Previous or present malignancy                       (2 points)                                                                                                                                                         HEMATOLOGY RELATED FACTORS                                                         [ ] Prior episodes of VTE                                     (3 Points)                     [ ] Positive family history for VTE                      (3 Points)                  [ ] Prothrombin 59619 A                                     (3 Points)                     [ ] Factor V Leiden                                                (3 Points)                        [ ] Lupus anticoagulants                                      (3 Points)                                                           [ ] Anticardiolipin antibodies                              (3 Points)                                                       [ ] High homocysteine in the blood                   (3 Points)                                             [ ] Other congenital or acquired thrombophilia      (3 Points)                                                [ ] Heparin induced thrombocytopenia                  (3 Points)                                        MOBILITY RELATED FACTORS  [ ] Bed rest                                                         (1 Point)  [ ] Plaster cast                                                    (2 points)  [ ] Bed bound for more than 72 hours           (2 Points)    GENDER SPECIFIC FACTORS  [ ] Pregnancy or had a baby within the last month   (1 Point)  [ ] Post-partum < 6 weeks                                   (1 Point)  [ ] Hormonal therapy  or oral contraception   (1 Point)  [ ] History of pregnancy complications              (1 point)  [ ] Unexplained or recurrent              (1 Point)    OTHER RISK FACTORS                                           (1 Point)  [ ] BMI >40, smoking, diabetes requiring insulin, chemotherapy  blood transfusions and length of surgery over 2 hours    SURGERY RELATED RISK FACTORS  [ ]  Section within the last month     (1 Point)  [ ] Minor surgery                                                  (1 Point)  [ ] Arthroscopic surgery                                       (2 Points)  [ ] Planned major surgery lasting more            (2 Points)      than 45 minutes     [ ] Elective hip or knee joint replacement       (5 points)       surgery                                                TRAUMA RELATED RISK FACTORS  [ ] Fracture of the hip, pelvis, or leg                       (5 Points)  [ ] Spinal cord injury resulting in paralysis             (5 points)       (in the previous month)    [ ] Paralysis  (less than 1 month)                             (5 Points)  [ ] Multiple Trauma within 1 month                        (5 Points)    Total Score [        ]    Caprini Score 0-2: Low Risk, NO VTE prophylaxis required for most patients, encourage ambulation  Caprini Score 3-6: Moderate Risk , pharmacologic VTE prophylaxis is indicated for most patients (in the absence of contraindications)  Caprini Score Greater than or =7: High risk, pharmocologic VTE prophylaxis indicated for most patients (in the absence of contraindications)                OPIOID RISK TOOL    DINORAH EACH BOX THAT APPLIES AND ADD TOTALS AT THE END    FAMILY HISTORY OF SUBSTANCE ABUSE                 FEMALE         MALE                                                Alcohol                             [  ]1 pt          [  ]3pts                                               Illegal Durgs                     [  ]2 pts        [  ]3pts                                               Rx Drugs                           [  ]4 pts        [  ]4 pts    PERSONAL HISTORY OF SUBSTANCE ABUSE                                                                                          Alcohol                             [  ]3 pts       [  ]3 pts                                               Illegal Durgs                     [  ]4 pts        [  ]4 pts                                               Rx Drugs                           [  ]5 pts        [  ]5 pts    AGE BETWEEN 16-45 YEARS                                      [  ]1 pt         [  ]1 pt    HISTORY OF PREADOLESCENT   SEXUAL ABUSE                                                             [  ]3 pts        [  ]0pts    PSYCHOLOGICAL DISEASE                     ADD, OCD, Bipolar, Schizophrenia        [  ]2 pts         [  ]2 pts                      Depression                                               [  ]1 pt           [  ]1 pt           SCORING TOTAL   (add numbers and type here)              (***)                                     A score of 3 or lower indicated LOW risk for future opiod abuse  A score of 4 to 7 indicated moderate risk for future opiod abuse  A score of 8 or higher indicates a high risk for opiod abuse               CAPRINI SCORE    AGE RELATED RISK FACTORS                                                             [x ] Age 41-60 years                                            (1 Point)  [ ] Age: 61-74 years                                           (2 Points)                 [ ] Age= 75 years                                                (3 Points)             DISEASE RELATED RISK FACTORS                                                       [ ] Edema in the lower extremities                 (1 Point)                     [ ] Varicose veins                                               (1 Point)                                 [x ] BMI > 25 Kg/m2                                            (1 Point)                                  [ ] Serious infection (ie PNA)                            (1 Point)                     [ ] Lung disease ( COPD, Emphysema)            (1 Point)                                                                          [ ] Acute myocardial infarction                         (1 Point)                  [ ] Congestive heart failure (in the previous month)  (1 Point)         [ ] Inflammatory bowel disease                            (1 Point)                  [ ] Central venous access, PICC or Port               (2 points)       (within the last month)                                                                [ ] Stroke (in the previous month)                        (5 Points)    [ ] Previous or present malignancy                       (2 points)                                                                                                                                                         HEMATOLOGY RELATED FACTORS                                                         [ ] Prior episodes of VTE                                     (3 Points)                     [ ] Positive family history for VTE                      (3 Points)                  [ ] Prothrombin 52953 A                                     (3 Points)                     [ ] Factor V Leiden                                                (3 Points)                        [ ] Lupus anticoagulants                                      (3 Points)                                                           [ ] Anticardiolipin antibodies                              (3 Points)                                                       [ ] High homocysteine in the blood                   (3 Points)                                             [x ] Other congenital or acquired thrombophilia      (3 Points)                                                [ ] Heparin induced thrombocytopenia                  (3 Points)                                        MOBILITY RELATED FACTORS  [ ] Bed rest                                                         (1 Point)  [ ] Plaster cast                                                    (2 points)  [ ] Bed bound for more than 72 hours           (2 Points)    GENDER SPECIFIC FACTORS  [ ] Pregnancy or had a baby within the last month   (1 Point)  [ ] Post-partum < 6 weeks                                   (1 Point)  [ ] Hormonal therapy  or oral contraception   (1 Point)  [ ] History of pregnancy complications              (1 point)  [ ] Unexplained or recurrent              (1 Point)    OTHER RISK FACTORS                                           (1 Point)  [ ] BMI >40, smoking, diabetes requiring insulin, chemotherapy  blood transfusions and length of surgery over 2 hours    SURGERY RELATED RISK FACTORS  [ ]  Section within the last month     (1 Point)  [ ] Minor surgery                                                  (1 Point)  [ ] Arthroscopic surgery                                       (2 Points)  [x ] Planned major surgery lasting more            (2 Points)      than 45 minutes     [ ] Elective hip or knee joint replacement       (5 points)       surgery                                                TRAUMA RELATED RISK FACTORS  [ ] Fracture of the hip, pelvis, or leg                       (5 Points)  [ ] Spinal cord injury resulting in paralysis             (5 points)       (in the previous month)    [ ] Paralysis  (less than 1 month)                             (5 Points)  [ ] Multiple Trauma within 1 month                        (5 Points)    Total Score [  7   ]    Caprini Score 0-2: Low Risk, NO VTE prophylaxis required for most patients, encourage ambulation  Caprini Score 3-6: Moderate Risk , pharmacologic VTE prophylaxis is indicated for most patients (in the absence of contraindications)  Caprini Score Greater than or =7: High risk, pharmocologic VTE prophylaxis indicated for most patients (in the absence of contraindications)        OPIOID RISK TOOL    DINORAH EACH BOX THAT APPLIES AND ADD TOTALS AT THE END    FAMILY HISTORY OF SUBSTANCE ABUSE                 FEMALE         MALE                                                Alcohol                             [  ]1 pt          [  ]3pts                                               Illegal Durgs                     [  ]2 pts        [  ]3pts                                               Rx Drugs                           [  ]4 pts        [  ]4 pts    PERSONAL HISTORY OF SUBSTANCE ABUSE                                                                                          Alcohol                             [  ]3 pts       [  ]3 pts                                               Illegal Durgs                     [  ]4 pts        [  ]4 pts                                               Rx Drugs                           [  ]5 pts        [  ]5 pts    AGE BETWEEN 16-45 YEARS                                      [  ]1 pt         [x  ]1 pt    HISTORY OF PREADOLESCENT   SEXUAL ABUSE                                                             [  ]3 pts        [  ]0pts    PSYCHOLOGICAL DISEASE                     ADD, OCD, Bipolar, Schizophrenia        [  ]2 pts         [  ]2 pts                      Depression                                               [  ]1 pt           [  ]1 pt           SCORING TOTAL  1                             A score of 3 or lower indicated LOW risk for future opiod abuse  A score of 4 to 7 indicated moderate risk for future opiod abuse  A score of 8 or higher indicates a high risk for opiod abuse    43 year old male with pmhx of JEREMY-1 deficiency, HTN, HANS on CPAP, presents with umbilical and right inguinal hernia x3 months pain is worse with driving, dull aching pain, 5/10 in severity, not taking otc medication. Patient is scheduled robotic right inguinal hernia repair, umbilical hernia repair on 23 with Dr Yung. Patient educated on surgical scrub, preadmission instructions, medical, hematology clearance and day of procedure medications, verbalizes understanding. Pt instructed to stop vitamins/supplements/herbal medications/ASA/NSAIDS for one week prior to surgery and discuss with PMD.

## 2023-05-11 NOTE — H&P PST ADULT - NSICDXPASTMEDICALHX_GEN_ALL_CORE_FT
PAST MEDICAL HISTORY:  Unilateral inguinal hernia without obstruction      PAST MEDICAL HISTORY:  H/O bleeding disorder     Hypertension     HANS on CPAP     Unilateral inguinal hernia without obstruction

## 2023-05-11 NOTE — H&P PST ADULT - NSICDXFAMILYHX_GEN_ALL_CORE_FT
FAMILY HISTORY:  Grandparent  Still living? Unknown  FH: hemophilia, Age at diagnosis: Age Unknown

## 2023-05-12 ENCOUNTER — TRANSCRIPTION ENCOUNTER (OUTPATIENT)
Age: 43
End: 2023-05-12

## 2023-05-12 NOTE — HISTORY OF PRESENT ILLNESS
[No Pertinent Cardiac History] : no history of aortic stenosis, atrial fibrillation, coronary artery disease, recent myocardial infarction, or implantable device/pacemaker [Sleep Apnea] : sleep apnea [No Adverse Anesthesia Reaction] : no adverse anesthesia reaction in self or family member [(Patient denies any chest pain, claudication, dyspnea on exertion, orthopnea, palpitations or syncope)] : Patient denies any chest pain, claudication, dyspnea on exertion, orthopnea, palpitations or syncope [Moderate (4-6 METs)] : Moderate (4-6 METs) [Asthma] : no asthma [COPD] : no COPD [Smoker] : not a smoker [Chronic Anticoagulation] : no chronic anticoagulation [Chronic Kidney Disease] : no chronic kidney disease [Diabetes] : no diabetes [FreeTextEntry1] : Robotic right inguinal hernia repair and umbilical hernia repair  [FreeTextEntry2] : 5/13/2023 [FreeTextEntry3] : Dr. Rom Yung  [FreeTextEntry4] : \par 43 year old male presents for a medical clearance. He is scheduled for a robotic right inguinal hernia repair and umbilical hernia repair.\par \par He has a rare blood clotting disorder- seen by hematology for clearance. \par \par has HTN- currently on Losartan 100 mg daily, Amlodipine 10 mg daily, Nebivolol 5 mg daily\par \par has Hyperlipidemia- off statin due to myalgias \par \par has narcolepsy, HANS- on CPAP, on Armodafinil \par follows with pulmonology \par \par currently on Ozempic for Prediabetes, Obesity\par has lost weight \par

## 2023-05-12 NOTE — ASU PATIENT PROFILE, ADULT - NSICDXPASTMEDICALHX_GEN_ALL_CORE_FT
PAST MEDICAL HISTORY:  H/O bleeding disorder     Hypertension     HANS on CPAP     Unilateral inguinal hernia without obstruction

## 2023-05-12 NOTE — ASSESSMENT
[High Risk Surgery - Intraperitoneal, Intrathoracic or Supringuinal Vascular Procedures] : High Risk Surgery - Intraperitoneal, Intrathoracic or Supringuinal Vascular Procedures - No (0) [Ischemic Heart Disease] : Ischemic Heart Disease - No (0) [Congestive Heart Failure] : Congestive Heart Failure - No (0) [Prior Cerebrovascular Accident or TIA] : Prior Cerebrovascular Accident or TIA - No (0) [Creatinine >= 2mg/dL (1 Point)] : Creatinine >= 2mg/dL - No (0) [Insulin-dependent Diabetic (1 Point)] : Insulin-dependent Diabetic - No (0) [0] : 0 , RCRI Class: I, Risk of Post-Op Cardiac Complications: 3.9%, 95% CI for Risk Estimate: 2.8% - 5.4% [Patient Optimized for Surgery] : Patient optimized for surgery [Continue medications as is] : Continue current medications [As per surgery] : as per surgery [FreeTextEntry4] : \par At this time, there are no medical contraindications for the planned surgery and anesthesia. \par \par PSTs reviewed with patient\par APTT elevated at 38.2- patient reports h/o blood clotting disorder and having received hematology clearance for surgery\par Tested positive for MRSA- patient reports starting on Bactroban ointment \par \par HTN- stable, c/w Losartan, Amlodipine, Nebivolol \par \par HANS- on CPAP\par \par Pre-diabetes- c/w Ozempic weekly (can hold on day of surgery)\par \par Please call with any questions. \par  [FreeTextEntry6] : Hold ASA/NSAIDS 1 week prior to surgery

## 2023-05-12 NOTE — PHYSICAL EXAM
[No Acute Distress] : no acute distress [Well Nourished] : well nourished [Well Developed] : well developed [Well-Appearing] : well-appearing [Normal Sclera/Conjunctiva] : normal sclera/conjunctiva [PERRL] : pupils equal round and reactive to light [Normal Oropharynx] : the oropharynx was normal [Normal TMs] : both tympanic membranes were normal [Normal Appearance] : was normal in appearance [Neck Supple] : was supple [No Respiratory Distress] : no respiratory distress  [Clear to Auscultation] : lungs were clear to auscultation bilaterally [Normal Rate] : normal rate  [Regular Rhythm] : with a regular rhythm [Normal S1, S2] : normal S1 and S2 [No Murmur] : no murmur heard [No Edema] : there was no peripheral edema [Soft] : abdomen soft [Non Tender] : non-tender [Normal Bowel Sounds] : normal bowel sounds [Normal Anterior Cervical Nodes] : no anterior cervical lymphadenopathy [No Rash] : no rash [Alert and Oriented x3] : oriented to person, place, and time [Normal Mood] : the mood was normal

## 2023-05-13 ENCOUNTER — TRANSCRIPTION ENCOUNTER (OUTPATIENT)
Age: 43
End: 2023-05-13

## 2023-05-13 ENCOUNTER — OUTPATIENT (OUTPATIENT)
Dept: INPATIENT UNIT | Facility: HOSPITAL | Age: 43
LOS: 1 days | End: 2023-05-13
Payer: COMMERCIAL

## 2023-05-13 ENCOUNTER — APPOINTMENT (OUTPATIENT)
Dept: SURGERY | Facility: HOSPITAL | Age: 43
End: 2023-05-13

## 2023-05-13 VITALS
DIASTOLIC BLOOD PRESSURE: 42 MMHG | HEIGHT: 68 IN | HEART RATE: 69 BPM | RESPIRATION RATE: 16 BRPM | TEMPERATURE: 98 F | SYSTOLIC BLOOD PRESSURE: 100 MMHG | OXYGEN SATURATION: 97 % | WEIGHT: 259.04 LBS

## 2023-05-13 VITALS
SYSTOLIC BLOOD PRESSURE: 117 MMHG | HEART RATE: 62 BPM | OXYGEN SATURATION: 97 % | RESPIRATION RATE: 14 BRPM | DIASTOLIC BLOOD PRESSURE: 65 MMHG

## 2023-05-13 DIAGNOSIS — K40.90 UNILATERAL INGUINAL HERNIA, WITHOUT OBSTRUCTION OR GANGRENE, NOT SPECIFIED AS RECURRENT: ICD-10-CM

## 2023-05-13 DIAGNOSIS — S83.511A SPRAIN OF ANTERIOR CRUCIATE LIGAMENT OF RIGHT KNEE, INITIAL ENCOUNTER: Chronic | ICD-10-CM

## 2023-05-13 DIAGNOSIS — Z98.890 OTHER SPECIFIED POSTPROCEDURAL STATES: Chronic | ICD-10-CM

## 2023-05-13 LAB
ABO RH CONFIRMATION: SIGNIFICANT CHANGE UP
GLUCOSE BLDC GLUCOMTR-MCNC: 100 MG/DL — HIGH (ref 70–99)

## 2023-05-13 PROCEDURE — C9399: CPT

## 2023-05-13 PROCEDURE — 49650 LAP ING HERNIA REPAIR INIT: CPT | Mod: RT

## 2023-05-13 PROCEDURE — S2900 ROBOTIC SURGICAL SYSTEM: CPT | Mod: NC

## 2023-05-13 PROCEDURE — 36415 COLL VENOUS BLD VENIPUNCTURE: CPT

## 2023-05-13 PROCEDURE — C1781: CPT

## 2023-05-13 PROCEDURE — 49650 LAP ING HERNIA REPAIR INIT: CPT

## 2023-05-13 PROCEDURE — S2900: CPT

## 2023-05-13 PROCEDURE — 49591 RPR AA HRN 1ST < 3 CM RDC: CPT

## 2023-05-13 PROCEDURE — 49591 RPR AA HRN 1ST < 3 CM RDC: CPT | Mod: AS

## 2023-05-13 PROCEDURE — 82962 GLUCOSE BLOOD TEST: CPT

## 2023-05-13 PROCEDURE — 49650 LAP ING HERNIA REPAIR INIT: CPT | Mod: AS

## 2023-05-13 DEVICE — MESH HERNIA INGUINAL PROGRIP LAPAROSCOPIC 15 X 10CM RIGHT: Type: IMPLANTABLE DEVICE | Site: RIGHT | Status: FUNCTIONAL

## 2023-05-13 RX ORDER — ACETAMINOPHEN 500 MG
975 TABLET ORAL ONCE
Refills: 0 | Status: COMPLETED | OUTPATIENT
Start: 2023-05-13 | End: 2023-05-13

## 2023-05-13 RX ORDER — HYDROMORPHONE HYDROCHLORIDE 2 MG/ML
1 INJECTION INTRAMUSCULAR; INTRAVENOUS; SUBCUTANEOUS
Refills: 0 | Status: DISCONTINUED | OUTPATIENT
Start: 2023-05-13 | End: 2023-05-13

## 2023-05-13 RX ORDER — LOSARTAN POTASSIUM 100 MG/1
1 TABLET, FILM COATED ORAL
Refills: 0 | DISCHARGE

## 2023-05-13 RX ORDER — AMINOCAPROIC ACID 500 MG/1
0 TABLET ORAL
Refills: 0 | DISCHARGE

## 2023-05-13 RX ORDER — BUPIVACAINE 13.3 MG/ML
20 INJECTION, SUSPENSION, LIPOSOMAL INFILTRATION ONCE
Refills: 0 | Status: DISCONTINUED | OUTPATIENT
Start: 2023-05-13 | End: 2023-05-13

## 2023-05-13 RX ORDER — HYDROMORPHONE HYDROCHLORIDE 2 MG/ML
0.5 INJECTION INTRAMUSCULAR; INTRAVENOUS; SUBCUTANEOUS
Refills: 0 | Status: DISCONTINUED | OUTPATIENT
Start: 2023-05-13 | End: 2023-05-13

## 2023-05-13 RX ORDER — SODIUM CHLORIDE 9 MG/ML
1000 INJECTION, SOLUTION INTRAVENOUS
Refills: 0 | Status: DISCONTINUED | OUTPATIENT
Start: 2023-05-13 | End: 2023-05-13

## 2023-05-13 RX ORDER — ARMODAFINIL 200 MG/1
1 TABLET ORAL
Refills: 0 | DISCHARGE

## 2023-05-13 RX ORDER — NEBIVOLOL HYDROCHLORIDE 5 MG/1
1 TABLET ORAL
Refills: 0 | DISCHARGE

## 2023-05-13 RX ORDER — CELECOXIB 200 MG/1
400 CAPSULE ORAL ONCE
Refills: 0 | Status: COMPLETED | OUTPATIENT
Start: 2023-05-13 | End: 2023-05-13

## 2023-05-13 RX ORDER — AMLODIPINE BESYLATE 2.5 MG/1
1 TABLET ORAL
Refills: 0 | DISCHARGE

## 2023-05-13 RX ADMIN — HYDROMORPHONE HYDROCHLORIDE 1 MILLIGRAM(S): 2 INJECTION INTRAMUSCULAR; INTRAVENOUS; SUBCUTANEOUS at 14:50

## 2023-05-13 RX ADMIN — Medication 975 MILLIGRAM(S): at 09:57

## 2023-05-13 RX ADMIN — Medication 1 APPLICATION(S): at 10:01

## 2023-05-13 RX ADMIN — CELECOXIB 400 MILLIGRAM(S): 200 CAPSULE ORAL at 09:57

## 2023-05-13 RX ADMIN — HYDROMORPHONE HYDROCHLORIDE 1 MILLIGRAM(S): 2 INJECTION INTRAMUSCULAR; INTRAVENOUS; SUBCUTANEOUS at 14:51

## 2023-05-13 RX ADMIN — HYDROMORPHONE HYDROCHLORIDE 1 MILLIGRAM(S): 2 INJECTION INTRAMUSCULAR; INTRAVENOUS; SUBCUTANEOUS at 14:34

## 2023-05-13 RX ADMIN — HYDROMORPHONE HYDROCHLORIDE 0.5 MILLIGRAM(S): 2 INJECTION INTRAMUSCULAR; INTRAVENOUS; SUBCUTANEOUS at 15:13

## 2023-05-13 RX ADMIN — HYDROMORPHONE HYDROCHLORIDE 1 MILLIGRAM(S): 2 INJECTION INTRAMUSCULAR; INTRAVENOUS; SUBCUTANEOUS at 15:01

## 2023-05-13 NOTE — BRIEF OPERATIVE NOTE - NSICDXBRIEFPREOP_GEN_ALL_CORE_FT
PRE-OP DIAGNOSIS:  Right inguinal hernia 13-May-2023 14:21:43  Rani Thomson  Umbilical hernia 13-May-2023 14:23:04  Rani Thomson

## 2023-05-13 NOTE — BRIEF OPERATIVE NOTE - NSICDXBRIEFPOSTOP_GEN_ALL_CORE_FT
POST-OP DIAGNOSIS:  Right inguinal hernia 13-May-2023 14:22:51  Rani Thomson  Hernia, umbilical 13-May-2023 14:22:58  Rani Thomson

## 2023-05-13 NOTE — ASU DISCHARGE PLAN (ADULT/PEDIATRIC) - CARE PROVIDER_API CALL
Rom Yung)  Surgery  [address] [facility] [address], [address] [place] [address]  Phone: [phone]  Fax: [phone]  Follow Up Time: 2 weeks

## 2023-05-13 NOTE — ASU PREOP CHECKLIST - BLOOD AVAILABLE
n/a
Follow up with [facility] ER in 1 week for re-evaluation of your cellulitis (skin infection) and assessment of your partial DVT (blood clot in the leg).    Return to ER sooner for chest pain, shortness of breath, fever, chills, worsening pain to leg, weakness, numbness, tingling.    Deep Vein Thrombosis    A deep vein thrombosis (DVT) is a blood clot (thrombus) that usually occurs in a deep, larger vein of the lower leg or the pelvis, or in an upper extremity such as the arm. These are dangerous and can lead to serious and even life-threatening complications if the clot travels to the lungs. Symptoms include swelling of the arm or leg, warmth and redness of the arm or leg, and pain. Treatment may include taking a blood thinning medication; make sure to take anything prescribed as instructed.    SEEK IMMEDIATE MEDICAL CARE IF YOU HAVE ANY OF THE FOLLOWING SYMPTOMS: shortness of breath, chest pain, rapid or irregular heartbeat, lightheadedness/dizziness, coughing up blood, or any bleeding in your vomit, stool, or urine. These symptoms may represent a serious problem that is an emergency. Do not wait to see if the symptoms will go away. Call 911 and do not drive yourself to the hospital.       Cellulitis    Cellulitis is a skin infection caused by bacteria. This condition occurs most often in the arms and lower legs but can occur anywhere over the body. Symptoms include redness, swelling, warm skin, tenderness, and chills/fever. If you were prescribed an antibiotic medicine, take it as told by your health care provider. Do not stop taking the antibiotic even if you start to feel better.    SEEK IMMEDIATE MEDICAL CARE IF YOU HAVE ANY OF THE FOLLOWING SYMPTOMS: worsening fever, red streaks coming from affected area, vomiting or diarrhea, or dizziness/lightheadedness.

## 2023-05-13 NOTE — BRIEF OPERATIVE NOTE - NSICDXBRIEFPROCEDURE_GEN_ALL_CORE_FT
PROCEDURES:  Robot-assisted repair of right inguinal hernia using da Kristi Xi 13-May-2023 14:21:34 + open umbilical hernia repair Rani Thomson

## 2023-05-13 NOTE — ASU DISCHARGE PLAN (ADULT/PEDIATRIC) - NS MD DC FALL RISK RISK
For information on Fall & Injury Prevention, visit: [email or web address] OR  [email or web address] OR  https://www.cdc.gov/steadi/patient.html

## 2023-05-13 NOTE — ASU DISCHARGE PLAN (ADULT/PEDIATRIC) - ASU DC SPECIAL INSTRUCTIONSFT
BATHING: Please do not submerge wound underwater. You may shower and/or sponge bathe.   ACTIVITY: No heavy lifting or straining.   DIET: Return to your usual diet.  NOTIFY YOUR SURGEON IF: You have any bleeding that does not stop, any pus draining from your wound(s), any fever (over 100.4 F) or chills, persistent nausea/vomiting, persistent diarrhea, or if your pain is not controlled on your discharge pain medications.

## 2023-05-15 ENCOUNTER — APPOINTMENT (OUTPATIENT)
Dept: CARDIOLOGY | Facility: CLINIC | Age: 43
End: 2023-05-15

## 2023-05-15 PROBLEM — Z86.2 PERSONAL HISTORY OF DISEASES OF THE BLOOD AND BLOOD-FORMING ORGANS AND CERTAIN DISORDERS INVOLVING THE IMMUNE MECHANISM: Chronic | Status: ACTIVE | Noted: 2023-05-11

## 2023-05-15 PROBLEM — G47.33 OBSTRUCTIVE SLEEP APNEA (ADULT) (PEDIATRIC): Chronic | Status: ACTIVE | Noted: 2023-05-11

## 2023-05-15 PROBLEM — K40.90 UNILATERAL INGUINAL HERNIA, WITHOUT OBSTRUCTION OR GANGRENE, NOT SPECIFIED AS RECURRENT: Chronic | Status: ACTIVE | Noted: 2023-05-11

## 2023-05-15 PROBLEM — I10 ESSENTIAL (PRIMARY) HYPERTENSION: Chronic | Status: ACTIVE | Noted: 2023-05-11

## 2023-05-22 ENCOUNTER — RX RENEWAL (OUTPATIENT)
Age: 43
End: 2023-05-22

## 2023-05-23 ENCOUNTER — TRANSCRIPTION ENCOUNTER (OUTPATIENT)
Age: 43
End: 2023-05-23

## 2023-05-23 ENCOUNTER — RX RENEWAL (OUTPATIENT)
Age: 43
End: 2023-05-23

## 2023-05-26 ENCOUNTER — APPOINTMENT (OUTPATIENT)
Dept: SURGERY | Facility: CLINIC | Age: 43
End: 2023-05-26
Payer: COMMERCIAL

## 2023-05-26 VITALS
HEART RATE: 68 BPM | BODY MASS INDEX: 37.44 KG/M2 | HEIGHT: 68 IN | OXYGEN SATURATION: 96 % | WEIGHT: 247 LBS | SYSTOLIC BLOOD PRESSURE: 128 MMHG | DIASTOLIC BLOOD PRESSURE: 82 MMHG

## 2023-05-26 PROCEDURE — 99024 POSTOP FOLLOW-UP VISIT: CPT

## 2023-05-26 NOTE — HISTORY OF PRESENT ILLNESS
[de-identified] : 43yo M with history of previous evaluation for bariatric surgery (didn't proceed for surgery), BMI 38, now presents with diagnosed reducible R inguinal hernia. Describes a mass effect feeling in the R groin. Denies sharp pain. No incarceration or bowel obstructions. No tobacco or EtOH use.\par No liver dz or immunosuppression.\par Also has a small fat-containing umbilical hernia, causes occasional pain. [de-identified] : Presents for postop follow-up. Doing well. No complaints. Pain resolved. No fevers or chills, eating well.

## 2023-05-26 NOTE — ASSESSMENT
[FreeTextEntry1] : 42M with BMI 38 presents with reducible, symptomatic umbilical hernia and RIH, now s/p robotic repair. Followup as needed. Can resume normal activities, no restrictions.

## 2023-05-26 NOTE — PHYSICAL EXAM
[JVD] : no jugular venous distention  [Normal Breath Sounds] : Normal breath sounds [Normal Heart Sounds] : normal heart sounds [Normal Rate and Rhythm] : normal rate and rhythm [Abdominal Masses] : No abdominal masses [No HSM] : no hepatosplenomegaly [No Rash or Lesion] : No rash or lesion [Alert] : alert [Oriented to Person] : oriented to person [Oriented to Place] : oriented to place [Oriented to Time] : oriented to time [Calm] : calm [de-identified] : well-appearing, NAD [de-identified] : MARK [de-identified] : soft, ND, NT, +BS, no recurrence noted. incisions c/d/i, no erythema [de-identified] : no CVAT

## 2023-08-28 ENCOUNTER — RX RENEWAL (OUTPATIENT)
Age: 43
End: 2023-08-28

## 2023-09-18 ENCOUNTER — TRANSCRIPTION ENCOUNTER (OUTPATIENT)
Age: 43
End: 2023-09-18

## 2023-11-04 ENCOUNTER — APPOINTMENT (OUTPATIENT)
Dept: FAMILY MEDICINE | Facility: CLINIC | Age: 43
End: 2023-11-04
Payer: SELF-PAY

## 2023-11-04 VITALS
HEART RATE: 78 BPM | HEIGHT: 68 IN | OXYGEN SATURATION: 98 % | DIASTOLIC BLOOD PRESSURE: 82 MMHG | TEMPERATURE: 97.3 F | BODY MASS INDEX: 40.01 KG/M2 | WEIGHT: 264 LBS | SYSTOLIC BLOOD PRESSURE: 130 MMHG

## 2023-11-04 DIAGNOSIS — Z23 ENCOUNTER FOR IMMUNIZATION: ICD-10-CM

## 2023-11-04 DIAGNOSIS — R73.03 PREDIABETES.: ICD-10-CM

## 2023-11-04 DIAGNOSIS — E78.5 HYPERLIPIDEMIA, UNSPECIFIED: ICD-10-CM

## 2023-11-04 DIAGNOSIS — E66.01 MORBID (SEVERE) OBESITY DUE TO EXCESS CALORIES: ICD-10-CM

## 2023-11-04 DIAGNOSIS — I10 ESSENTIAL (PRIMARY) HYPERTENSION: ICD-10-CM

## 2023-11-04 PROCEDURE — 99214 OFFICE O/P EST MOD 30 MIN: CPT | Mod: 25

## 2023-11-04 PROCEDURE — G0008: CPT

## 2023-11-04 PROCEDURE — 90686 IIV4 VACC NO PRSV 0.5 ML IM: CPT

## 2023-11-04 RX ORDER — SEMAGLUTIDE 0.5 MG/.5ML
0.5 INJECTION, SOLUTION SUBCUTANEOUS
Qty: 1 | Refills: 1 | Status: ACTIVE | COMMUNITY
Start: 2023-11-04 | End: 1900-01-01

## 2023-11-04 RX ORDER — SEMAGLUTIDE 1.34 MG/ML
4 INJECTION, SOLUTION SUBCUTANEOUS
Qty: 6 | Refills: 0 | Status: DISCONTINUED | COMMUNITY
Start: 2022-10-16 | End: 2023-11-04

## 2023-11-13 ENCOUNTER — RX RENEWAL (OUTPATIENT)
Age: 43
End: 2023-11-13

## 2023-11-15 RX ORDER — SEMAGLUTIDE 1.7 MG/.75ML
1.7 INJECTION, SOLUTION SUBCUTANEOUS
Qty: 1 | Refills: 0 | Status: ACTIVE | COMMUNITY
Start: 2023-05-10

## 2023-11-16 ENCOUNTER — TRANSCRIPTION ENCOUNTER (OUTPATIENT)
Age: 43
End: 2023-11-16

## 2023-12-09 ENCOUNTER — TRANSCRIPTION ENCOUNTER (OUTPATIENT)
Age: 43
End: 2023-12-09

## 2023-12-09 LAB
25(OH)D3 SERPL-MCNC: 58.5 NG/ML
ALBUMIN SERPL ELPH-MCNC: 4.9 G/DL
ALP BLD-CCNC: 87 U/L
ALT SERPL-CCNC: 28 U/L
ANION GAP SERPL CALC-SCNC: 10 MMOL/L
APPEARANCE: CLEAR
AST SERPL-CCNC: 21 U/L
BACTERIA: NEGATIVE /HPF
BASOPHILS # BLD AUTO: 0.03 K/UL
BASOPHILS NFR BLD AUTO: 0.4 %
BILIRUB SERPL-MCNC: 0.2 MG/DL
BILIRUBIN URINE: NEGATIVE
BLOOD URINE: NEGATIVE
BUN SERPL-MCNC: 17 MG/DL
CALCIUM SERPL-MCNC: 9.4 MG/DL
CAST: 0 /LPF
CHLORIDE SERPL-SCNC: 102 MMOL/L
CHOLEST SERPL-MCNC: 259 MG/DL
CO2 SERPL-SCNC: 25 MMOL/L
COLOR: YELLOW
CREAT SERPL-MCNC: 0.85 MG/DL
EGFR: 112 ML/MIN/1.73M2
EOSINOPHIL # BLD AUTO: 0.15 K/UL
EOSINOPHIL NFR BLD AUTO: 2 %
EPITHELIAL CELLS: 0 /HPF
ESTIMATED AVERAGE GLUCOSE: 123 MG/DL
FOLATE SERPL-MCNC: 12.4 NG/ML
GLUCOSE QUALITATIVE U: NEGATIVE MG/DL
GLUCOSE SERPL-MCNC: 101 MG/DL
HBA1C MFR BLD HPLC: 5.9 %
HCT VFR BLD CALC: 44.3 %
HDLC SERPL-MCNC: 55 MG/DL
HGB BLD-MCNC: 14.4 G/DL
IMM GRANULOCYTES NFR BLD AUTO: 0.5 %
INSULIN P FAST SERPL-ACNC: 15.9 UU/ML
KETONES URINE: NEGATIVE MG/DL
LDLC SERPL CALC-MCNC: 164 MG/DL
LEUKOCYTE ESTERASE URINE: NEGATIVE
LYMPHOCYTES # BLD AUTO: 2.03 K/UL
LYMPHOCYTES NFR BLD AUTO: 27.3 %
MAN DIFF?: NORMAL
MCHC RBC-ENTMCNC: 29.4 PG
MCHC RBC-ENTMCNC: 32.5 GM/DL
MCV RBC AUTO: 90.4 FL
MICROSCOPIC-UA: NORMAL
MONOCYTES # BLD AUTO: 0.59 K/UL
MONOCYTES NFR BLD AUTO: 7.9 %
NEUTROPHILS # BLD AUTO: 4.59 K/UL
NEUTROPHILS NFR BLD AUTO: 61.9 %
NITRITE URINE: NEGATIVE
NONHDLC SERPL-MCNC: 204 MG/DL
PH URINE: 6
PLATELET # BLD AUTO: 346 K/UL
POTASSIUM SERPL-SCNC: 4.6 MMOL/L
PROT SERPL-MCNC: 7.4 G/DL
PROTEIN URINE: NEGATIVE MG/DL
RBC # BLD: 4.9 M/UL
RBC # FLD: 12.9 %
RED BLOOD CELLS URINE: 0 /HPF
SODIUM SERPL-SCNC: 137 MMOL/L
SPECIFIC GRAVITY URINE: 1.02
T4 FREE SERPL-MCNC: 1 NG/DL
TRIGL SERPL-MCNC: 218 MG/DL
TSH SERPL-ACNC: 1.8 UIU/ML
URATE SERPL-MCNC: 5.6 MG/DL
UROBILINOGEN URINE: 0.2 MG/DL
VIT B12 SERPL-MCNC: 737 PG/ML
WBC # FLD AUTO: 7.43 K/UL
WHITE BLOOD CELLS URINE: 0 /HPF

## 2023-12-17 ENCOUNTER — TRANSCRIPTION ENCOUNTER (OUTPATIENT)
Age: 43
End: 2023-12-17

## 2023-12-19 ENCOUNTER — TRANSCRIPTION ENCOUNTER (OUTPATIENT)
Age: 43
End: 2023-12-19

## 2023-12-20 ENCOUNTER — TRANSCRIPTION ENCOUNTER (OUTPATIENT)
Age: 43
End: 2023-12-20

## 2023-12-26 ENCOUNTER — TRANSCRIPTION ENCOUNTER (OUTPATIENT)
Age: 43
End: 2023-12-26

## 2023-12-28 ENCOUNTER — TRANSCRIPTION ENCOUNTER (OUTPATIENT)
Age: 43
End: 2023-12-28

## 2023-12-29 RX ORDER — TIRZEPATIDE 2.5 MG/.5ML
2.5 INJECTION, SOLUTION SUBCUTANEOUS
Qty: 1 | Refills: 0 | Status: ACTIVE | COMMUNITY
Start: 2023-12-28 | End: 1900-01-01

## 2024-01-25 ENCOUNTER — APPOINTMENT (OUTPATIENT)
Dept: PULMONOLOGY | Facility: CLINIC | Age: 44
End: 2024-01-25
Payer: SELF-PAY

## 2024-01-25 VITALS
WEIGHT: 261 LBS | BODY MASS INDEX: 39.56 KG/M2 | DIASTOLIC BLOOD PRESSURE: 80 MMHG | SYSTOLIC BLOOD PRESSURE: 135 MMHG | HEIGHT: 68 IN | TEMPERATURE: 97.8 F | OXYGEN SATURATION: 96 % | HEART RATE: 52 BPM | RESPIRATION RATE: 14 BRPM

## 2024-01-25 DIAGNOSIS — G47.33 OBSTRUCTIVE SLEEP APNEA (ADULT) (PEDIATRIC): ICD-10-CM

## 2024-01-25 DIAGNOSIS — G47.419 NARCOLEPSY W/OUT CATAPLEXY: ICD-10-CM

## 2024-01-25 PROCEDURE — 99213 OFFICE O/P EST LOW 20 MIN: CPT

## 2024-01-25 RX ORDER — ARMODAFINIL 150 MG/1
150 TABLET ORAL
Qty: 90 | Refills: 1 | Status: ACTIVE | COMMUNITY
Start: 2021-10-01 | End: 1900-01-01

## 2024-02-09 RX ORDER — NEBIVOLOL 5 MG/1
5 TABLET ORAL
Qty: 90 | Refills: 0 | Status: ACTIVE | COMMUNITY
Start: 2022-11-11 | End: 1900-01-01

## 2024-03-06 ENCOUNTER — RX RENEWAL (OUTPATIENT)
Age: 44
End: 2024-03-06

## 2024-06-03 ENCOUNTER — RX RENEWAL (OUTPATIENT)
Age: 44
End: 2024-06-03

## 2024-06-03 RX ORDER — AMLODIPINE BESYLATE 10 MG/1
10 TABLET ORAL
Qty: 90 | Refills: 0 | Status: ACTIVE | COMMUNITY
Start: 2022-01-17

## 2024-06-03 RX ORDER — LOSARTAN POTASSIUM 100 MG/1
100 TABLET, FILM COATED ORAL
Qty: 90 | Refills: 0 | Status: ACTIVE | COMMUNITY
Start: 2022-05-09 | End: 1900-01-01

## 2024-11-15 ENCOUNTER — APPOINTMENT (OUTPATIENT)
Dept: FAMILY MEDICINE | Facility: CLINIC | Age: 44
End: 2024-11-15
Payer: COMMERCIAL

## 2024-11-15 VITALS
WEIGHT: 231 LBS | TEMPERATURE: 98 F | HEART RATE: 55 BPM | DIASTOLIC BLOOD PRESSURE: 60 MMHG | SYSTOLIC BLOOD PRESSURE: 104 MMHG | OXYGEN SATURATION: 98 % | HEIGHT: 68 IN | BODY MASS INDEX: 35.01 KG/M2

## 2024-11-15 DIAGNOSIS — R73.03 PREDIABETES.: ICD-10-CM

## 2024-11-15 DIAGNOSIS — Z13.0 ENCOUNTER FOR SCREENING FOR OTHER SUSPECTED ENDOCRINE DISORDER: ICD-10-CM

## 2024-11-15 DIAGNOSIS — Z13.29 ENCOUNTER FOR SCREENING FOR OTHER SUSPECTED ENDOCRINE DISORDER: ICD-10-CM

## 2024-11-15 DIAGNOSIS — E78.5 HYPERLIPIDEMIA, UNSPECIFIED: ICD-10-CM

## 2024-11-15 DIAGNOSIS — I10 ESSENTIAL (PRIMARY) HYPERTENSION: ICD-10-CM

## 2024-11-15 DIAGNOSIS — E66.01 MORBID (SEVERE) OBESITY DUE TO EXCESS CALORIES: ICD-10-CM

## 2024-11-15 DIAGNOSIS — Z00.00 ENCOUNTER FOR GENERAL ADULT MEDICAL EXAMINATION W/OUT ABNORMAL FINDINGS: ICD-10-CM

## 2024-11-15 DIAGNOSIS — Z13.228 ENCOUNTER FOR SCREENING FOR OTHER SUSPECTED ENDOCRINE DISORDER: ICD-10-CM

## 2024-11-15 PROCEDURE — 99396 PREV VISIT EST AGE 40-64: CPT

## 2024-11-15 PROCEDURE — G0444 DEPRESSION SCREEN ANNUAL: CPT | Mod: 59

## 2024-11-15 PROCEDURE — 36415 COLL VENOUS BLD VENIPUNCTURE: CPT

## 2024-11-16 LAB
25(OH)D3 SERPL-MCNC: 83.5 NG/ML
ALBUMIN SERPL ELPH-MCNC: 5 G/DL
ALP BLD-CCNC: 64 U/L
ALT SERPL-CCNC: 14 U/L
ANION GAP SERPL CALC-SCNC: 15 MMOL/L
AST SERPL-CCNC: 12 U/L
BASOPHILS # BLD AUTO: 0.03 K/UL
BASOPHILS NFR BLD AUTO: 0.4 %
BILIRUB SERPL-MCNC: 0.5 MG/DL
BUN SERPL-MCNC: 22 MG/DL
CALCIUM SERPL-MCNC: 10.5 MG/DL
CHLORIDE SERPL-SCNC: 99 MMOL/L
CHOLEST SERPL-MCNC: 231 MG/DL
CO2 SERPL-SCNC: 24 MMOL/L
CREAT SERPL-MCNC: 0.93 MG/DL
EGFR: 105 ML/MIN/1.73M2
EOSINOPHIL # BLD AUTO: 0.11 K/UL
EOSINOPHIL NFR BLD AUTO: 1.4 %
ESTIMATED AVERAGE GLUCOSE: 114 MG/DL
GLUCOSE SERPL-MCNC: 107 MG/DL
HBA1C MFR BLD HPLC: 5.6 %
HCT VFR BLD CALC: 45.1 %
HDLC SERPL-MCNC: 63 MG/DL
HGB BLD-MCNC: 14.5 G/DL
IMM GRANULOCYTES NFR BLD AUTO: 0.4 %
LDLC SERPL CALC-MCNC: 144 MG/DL
LYMPHOCYTES # BLD AUTO: 2.38 K/UL
LYMPHOCYTES NFR BLD AUTO: 29.9 %
MAN DIFF?: NORMAL
MCHC RBC-ENTMCNC: 29.1 PG
MCHC RBC-ENTMCNC: 32.2 G/DL
MCV RBC AUTO: 90.4 FL
MONOCYTES # BLD AUTO: 0.62 K/UL
MONOCYTES NFR BLD AUTO: 7.8 %
NEUTROPHILS # BLD AUTO: 4.8 K/UL
NEUTROPHILS NFR BLD AUTO: 60.1 %
NONHDLC SERPL-MCNC: 168 MG/DL
PLATELET # BLD AUTO: 385 K/UL
POTASSIUM SERPL-SCNC: 5.2 MMOL/L
PROT SERPL-MCNC: 7.6 G/DL
RBC # BLD: 4.99 M/UL
RBC # FLD: 13.2 %
SODIUM SERPL-SCNC: 139 MMOL/L
TESTOST SERPL-MCNC: 900 NG/DL
TRIGL SERPL-MCNC: 136 MG/DL
TSH SERPL-ACNC: 0.74 UIU/ML
WBC # FLD AUTO: 7.97 K/UL

## 2024-11-18 ENCOUNTER — TRANSCRIPTION ENCOUNTER (OUTPATIENT)
Age: 44
End: 2024-11-18

## 2024-12-06 ENCOUNTER — TRANSCRIPTION ENCOUNTER (OUTPATIENT)
Age: 44
End: 2024-12-06

## 2025-01-30 ENCOUNTER — APPOINTMENT (OUTPATIENT)
Dept: PULMONOLOGY | Facility: CLINIC | Age: 45
End: 2025-01-30
Payer: COMMERCIAL

## 2025-01-30 VITALS
WEIGHT: 238 LBS | SYSTOLIC BLOOD PRESSURE: 120 MMHG | HEART RATE: 56 BPM | OXYGEN SATURATION: 94 % | BODY MASS INDEX: 36.07 KG/M2 | TEMPERATURE: 97.9 F | HEIGHT: 68 IN | DIASTOLIC BLOOD PRESSURE: 67 MMHG

## 2025-01-30 DIAGNOSIS — G47.33 OBSTRUCTIVE SLEEP APNEA (ADULT) (PEDIATRIC): ICD-10-CM

## 2025-01-30 DIAGNOSIS — G47.419 NARCOLEPSY W/OUT CATAPLEXY: ICD-10-CM

## 2025-01-30 PROCEDURE — 99214 OFFICE O/P EST MOD 30 MIN: CPT

## 2025-01-30 PROCEDURE — G2211 COMPLEX E/M VISIT ADD ON: CPT

## 2025-02-11 ENCOUNTER — RX RENEWAL (OUTPATIENT)
Age: 45
End: 2025-02-11

## 2025-04-22 NOTE — HISTORY OF PRESENT ILLNESS
[FreeTextEntry1] : Follow up [de-identified] : 42 year old male presents for a follow up\par \par Developed sudden onset of blurry vision about a month ago\par went to see ophthalmology- was informed he has astigmatism\par unclear etiology for blurred vision at the moment\par was advised to get updated blood work to reassess his glucose level/A1C \par \par has HTN- currently on Losartan 50 mg daily, Chlorthalidone 50 mg daily, Amlodipine 10 mg daily \par \par has Hyperlipidemia- on Rosuvastatin 5 mg daily\par \par has narcolepsy, HANS- on CPAP\par  Detail Level: Detailed Depth Of Biopsy: dermis Was A Bandage Applied: Yes Size Of Lesion In Cm: 0 Biopsy Type: H and E Biopsy Method: 15 blade Anesthesia Type: 1% lidocaine with epinephrine Anesthesia Volume In Cc: 0.5 Hemostasis: Drysol Wound Care: Petrolatum Dressing: bandage Destruction After The Procedure: No Type Of Destruction Used: Curettage Curettage Text: The wound bed was treated with curettage after the biopsy was performed. Cryotherapy Text: The wound bed was treated with cryotherapy after the biopsy was performed. Electrodesiccation Text: The wound bed was treated with electrodesiccation after the biopsy was performed. Electrodesiccation And Curettage Text: The wound bed was treated with electrodesiccation and curettage after the biopsy was performed. Silver Nitrate Text: The wound bed was treated with silver nitrate after the biopsy was performed. Lab: [id number] Medical Necessity Information: It is in your best interest to select a reason for this procedure from the list below. All of these items fulfill various CMS LCD requirements except the new and changing color options. Consent: Written consent was obtained and risks were reviewed including but not limited to scarring, infection, bleeding, scabbing, incomplete removal, nerve damage and allergy to anesthesia. Post-Care Instructions: I reviewed with the patient in detail post-care instructions. Patient is to keep the biopsy site dry overnight, and then apply bacitracin twice daily until healed. Patient may apply hydrogen peroxide soaks to remove any crusting. Notification Instructions: Patient will be notified of biopsy results. However, patient instructed to call the office if not contacted within 2 weeks. Billing Type: Third-Party Bill Information: Selecting Yes will display possible errors in your note based on the variables you have selected. This validation is only offered as a suggestion for you. PLEASE NOTE THAT THE VALIDATION TEXT WILL BE REMOVED WHEN YOU FINALIZE YOUR NOTE. IF YOU WANT TO FAX A PRELIMINARY NOTE YOU WILL NEED TO TOGGLE THIS TO 'NO' IF YOU DO NOT WANT IT IN YOUR FAXED NOTE.

## 2025-04-24 NOTE — HISTORY OF PRESENT ILLNESS
[Worsening] : worsening [___ mths] : [unfilled] month(s) ago [3] : an average pain level of 3/10 [1] : a minimum pain level of 1/10 [5] : a maximum pain level of 5/10 [Bending] : worsened by bending [de-identified] : OTILIO CHIRINOS is a 41 year male being seen for initial visit R hip pain. 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 Currently, he reports most pain when driving. He states the pain does not get worse with movement. He endorses pain over posterior aspect of R hip. Patient denies numbness and tingling to the extremities. He endorses painful clicking and popping in hip. Patient denies numbness and tingling to the extremities. He denies use of OTC pain medications. He has not done any formal PT. \par  62

## 2025-05-12 ENCOUNTER — RX RENEWAL (OUTPATIENT)
Age: 45
End: 2025-05-12

## 2025-06-02 ENCOUNTER — TRANSCRIPTION ENCOUNTER (OUTPATIENT)
Age: 45
End: 2025-06-02

## 2025-06-10 ENCOUNTER — APPOINTMENT (OUTPATIENT)
Dept: FAMILY MEDICINE | Facility: CLINIC | Age: 45
End: 2025-06-10
Payer: COMMERCIAL

## 2025-06-10 VITALS
SYSTOLIC BLOOD PRESSURE: 120 MMHG | HEART RATE: 63 BPM | DIASTOLIC BLOOD PRESSURE: 70 MMHG | OXYGEN SATURATION: 98 % | HEIGHT: 68 IN | BODY MASS INDEX: 35.31 KG/M2 | TEMPERATURE: 97 F | WEIGHT: 233 LBS

## 2025-06-10 PROBLEM — J06.9 VIRAL UPPER RESPIRATORY TRACT INFECTION: Status: ACTIVE | Noted: 2025-06-10 | Resolved: 2025-07-10

## 2025-06-10 PROCEDURE — 99213 OFFICE O/P EST LOW 20 MIN: CPT

## 2025-06-10 RX ORDER — METHYLPREDNISOLONE 4 MG/1
4 TABLET ORAL
Qty: 1 | Refills: 0 | Status: ACTIVE | COMMUNITY
Start: 2025-06-10 | End: 1900-01-01

## 2025-06-12 LAB
BACTERIA THROAT CULT: NORMAL
INFLUENZA A RESULT: NOT DETECTED
INFLUENZA B RESULT: NOT DETECTED
RESP SYN VIRUS RESULT: NOT DETECTED
SARS-COV-2 RESULT: NOT DETECTED

## 2025-08-04 ENCOUNTER — RX RENEWAL (OUTPATIENT)
Age: 45
End: 2025-08-04

## 2025-08-11 ENCOUNTER — RX RENEWAL (OUTPATIENT)
Age: 45
End: 2025-08-11

## (undated) DEVICE — XI SEAL UNIV 5- 8 MM

## (undated) DEVICE — SUT VLOC 180 3-0 6" V-20 GREEN

## (undated) DEVICE — SUT MONOCRYL 4-0 27" PS-2 UNDYED

## (undated) DEVICE — XI OBTURATOR OPTICAL BLADELESS 8MM

## (undated) DEVICE — XI DRAPE COLUMN

## (undated) DEVICE — DRAPE XL SHEET 77X98"

## (undated) DEVICE — ELCTR CORD FOOTSWITCH 1PLR LAPSCP 10FT

## (undated) DEVICE — DRAPE TOWEL BLUE STICKY

## (undated) DEVICE — SUT VICRYL 0 27" UR-6

## (undated) DEVICE — DRAPE MAJOR ABDOMINAL W POUCHES

## (undated) DEVICE — XI TIP COVER

## (undated) DEVICE — PACK ROBOTIC